# Patient Record
Sex: FEMALE | Race: WHITE | NOT HISPANIC OR LATINO | Employment: OTHER | URBAN - METROPOLITAN AREA
[De-identification: names, ages, dates, MRNs, and addresses within clinical notes are randomized per-mention and may not be internally consistent; named-entity substitution may affect disease eponyms.]

---

## 2017-01-31 ENCOUNTER — PATIENT MESSAGE (OUTPATIENT)
Dept: OBSTETRICS AND GYNECOLOGY | Facility: CLINIC | Age: 62
End: 2017-01-31

## 2017-01-31 ENCOUNTER — TELEPHONE (OUTPATIENT)
Dept: OBSTETRICS AND GYNECOLOGY | Facility: CLINIC | Age: 62
End: 2017-01-31

## 2017-01-31 NOTE — TELEPHONE ENCOUNTER
----- Message from Yamilet Padron sent at 1/31/2017  3:07 PM CST -----  Contact: Patient  x 1st Request  _ 2nd Request  _ 3rd Request    Who: SHIRLEY ALSTON [[id number]]    Why: Patient is calling in regards to scheduling a consult for hormones. Patient is needing a call back from staff.     What Number to Call Back: Patient can be reached at [phone].    When to Expect a call back: (Before the end of the day)  -- if call after 3:00 call back will be tomorrow.

## 2017-01-31 NOTE — TELEPHONE ENCOUNTER
Pt notified that Premarin vaginal cream from [facility] has arrived at office and she can  at her convenience. States will  this Thurs

## 2017-01-31 NOTE — TELEPHONE ENCOUNTER
"Advised pt per Dr Bay he recommeds she see Dr Armando for consult regarding HRT, she is specialist in this area. Explained Dr Bay is specialist for [facility] and pt should establish herself with regular gyn for her routine care. Pt states she does not wish to see any "female doctors" only wants to see "male doctors" requests list of male gyn drs at [facility] be sent to her [employer] system. Also complains of area of vulva irritation "not healed", states has not used clobetasol consistently, states does not like ointment would rather cream or paste. Explained why Dr Bay prefers ointment over cream and that he does not use any "paste". Encouraged pt to use medication as directed and if area of concern continues to be a problem will need to schedule follow up appt in vulva clinic for evaluation. Pt states "in post office have to go" hung up  "

## 2017-02-01 ENCOUNTER — PATIENT MESSAGE (OUTPATIENT)
Dept: OBSTETRICS AND GYNECOLOGY | Facility: CLINIC | Age: 62
End: 2017-02-01

## 2017-04-24 ENCOUNTER — TELEPHONE (OUTPATIENT)
Dept: OBSTETRICS AND GYNECOLOGY | Facility: CLINIC | Age: 62
End: 2017-04-24

## 2017-04-24 NOTE — TELEPHONE ENCOUNTER
----- Message from Glenroy Barajas sent at 4/24/2017 12:25 PM CDT -----  Contact: Pt  X_ 1st Request  _ 2nd Request  _ 3rd Request    Who:SHIRLEY ALSTON [[id number]]    Why: Patient would like to speak with staff in regards to a follow up appt at the vulva clinic; patient needs a refill of conjugated estrogens (PREMARIN) vaginal cream called into [facility] patient assistant program    What Number to Call Back: [phone] (not between 1 & 2) do not contact through the portal at the moment    When to Expect a call back: (Before the end of the day)  -- if call after 3:00 call back will be tomorrow.

## 2017-04-24 NOTE — TELEPHONE ENCOUNTER
[facility] clinic appt scheduled with brittany Pemberton pt ID # [id number], contact # [phone]. Order placed for Premarin vaginal cream, order # [id number]- should be received within 10 business days.

## 2017-04-27 ENCOUNTER — PATIENT MESSAGE (OUTPATIENT)
Dept: OBSTETRICS AND GYNECOLOGY | Facility: CLINIC | Age: 62
End: 2017-04-27

## 2017-05-16 ENCOUNTER — OFFICE VISIT (OUTPATIENT)
Dept: OBSTETRICS AND GYNECOLOGY | Facility: CLINIC | Age: 62
End: 2017-05-16
Attending: OBSTETRICS & GYNECOLOGY
Payer: COMMERCIAL

## 2017-05-16 VITALS
WEIGHT: 199.31 LBS | HEIGHT: 64 IN | BODY MASS INDEX: 34.03 KG/M2 | SYSTOLIC BLOOD PRESSURE: 148 MMHG | DIASTOLIC BLOOD PRESSURE: 90 MMHG

## 2017-05-16 DIAGNOSIS — B37.31 CANDIDIASIS OF VULVA AND VAGINA: ICD-10-CM

## 2017-05-16 DIAGNOSIS — N95.2 POSTMENOPAUSAL ATROPHIC VAGINITIS: ICD-10-CM

## 2017-05-16 DIAGNOSIS — N95.1 POST MENOPAUSAL SYNDROME: ICD-10-CM

## 2017-05-16 DIAGNOSIS — Z79.890 POSTMENOPAUSAL HORMONE REPLACEMENT THERAPY: ICD-10-CM

## 2017-05-16 DIAGNOSIS — N76.2 ATROPHIC VULVITIS: ICD-10-CM

## 2017-05-16 DIAGNOSIS — L90.0 LICHEN SCLEROSUS: Primary | ICD-10-CM

## 2017-05-16 DIAGNOSIS — N90.89 IRRITATION OF VULVA: ICD-10-CM

## 2017-05-16 PROCEDURE — 99999 PR PBB SHADOW E&M-EST. PATIENT-LVL III: CPT | Mod: PBBFAC,,, | Performed by: OBSTETRICS & GYNECOLOGY

## 2017-05-16 PROCEDURE — 87210 SMEAR WET MOUNT SALINE/INK: CPT | Mod: QW,S$GLB,, | Performed by: OBSTETRICS & GYNECOLOGY

## 2017-05-16 PROCEDURE — 87102 FUNGUS ISOLATION CULTURE: CPT

## 2017-05-16 PROCEDURE — 3080F DIAST BP >= 90 MM HG: CPT | Mod: S$GLB,,, | Performed by: OBSTETRICS & GYNECOLOGY

## 2017-05-16 PROCEDURE — 1160F RVW MEDS BY RX/DR IN RCRD: CPT | Mod: S$GLB,,, | Performed by: OBSTETRICS & GYNECOLOGY

## 2017-05-16 PROCEDURE — 99214 OFFICE O/P EST MOD 30 MIN: CPT | Mod: S$GLB,,, | Performed by: OBSTETRICS & GYNECOLOGY

## 2017-05-16 PROCEDURE — 3077F SYST BP >= 140 MM HG: CPT | Mod: S$GLB,,, | Performed by: OBSTETRICS & GYNECOLOGY

## 2017-05-16 RX ORDER — PROGESTERONE 200 MG/1
CAPSULE ORAL
Qty: 30 CAPSULE | Refills: 11 | Status: SHIPPED | OUTPATIENT
Start: 2017-05-16 | End: 2023-10-13 | Stop reason: CLARIF

## 2017-05-16 RX ORDER — ESTRADIOL 0.04 MG/D
1 FILM, EXTENDED RELEASE TRANSDERMAL
Qty: 8 PATCH | Refills: 11 | Status: SHIPPED | OUTPATIENT
Start: 2017-05-18 | End: 2021-01-05

## 2017-05-16 NOTE — PROGRESS NOTES
Subjective:     Patient ID: Tori Hercules is a 62 y.o. female.     Chief Complaint: vulva irritation (follow up, last candida culture positive albicans, DOMINICK)     History of Present Illness: This patient is a 62 y.o. female, who presents to the GYN [facility] for evaluation of suspected lichen sclerosis and vulvar irritation and itching.  The patient complains of progressive problems with menopausal symptoms and is desirous of restarting her systemic hormone replacement therapy.     Patient's last menstrual period was 11/02/2016.    Review of Systems    GENERAL: No fever, chills, fatigability or weightchange  SKIN: No rashes, itching or changes in color or texture of skin.  HEAD: No headaches or recent head trauma.  EYES: Visual acuity fine. No photophobia,r diplopia.  EARS: Denies earache or vertigo  NOSE: No loss of smell, no epistaxis or postnasal drip.  MOUTH & THROAT: No hoarseness or change in voice.   NODES: Denies swollen glands.  CHEST: Denies ALMODOVAR, cyanosis, wheezing, cough and sputum production.  CARDIOVASCULAR: Denies chest pain, PND, orthopnea or reduced exercise tolerance.  ABDOMEN: Appetite fine. No weight loss. bloating, Denies diarrhea, abdominal pain, hematemesis or blood in stool.  URINARY: No flank pain, dysuria or hematuria.  PERIPHERAL VASCULAR: No claudication or cyanosis.Varicosities  MUSCULOSKELETAL: No joint stiffness or swelling. Denies back pain.muscle aches  NEUROLOGIC: No history of seizures, paralysis, alteration of gait or coordination.       Objective:       Physical Exam     APPEARANCE: Well nourished, well developed, in no acute distress.    GENITOURINARY:  Vulva: The vulva architecture was abnormal with  fusion of the prepuce and frenulum with burying of the clitoris under the scarred clitoral adams, there was mild reabsorption of the labia minora, the right being greater than the left. The tender, thin, white plaque located on the right side of the vestibule/perineum junction  has disappeared. The 2 mm ulcerated area on the right at the introitus has healed.  Urethral Meatus: Normal size and location, no lesions, no prolapse.  Urethra: No masses, tenderness, prolapse or scarring.  Vagina: thin, atrophic and with decreased rugae, no discharge, no significant cystocele or rectocele.  Cervix: No lesions, normal diameter, no stenosis, no cervical motion tenderness. .  Uterus: 6 week size, regular shape, mobile, non-tender, normal position, good support.  Adnexa: No masses, tenderness or CDS nodularity.  Anus Perineum: No lesions, no relaxation, no external hemorrhoids.  Abdomen: No masses, tenderness, hernia or ascites, no hepatasplenomegaly  Skin: No rashes, lesions, ulcers, acne, hirsutism.  Peripheral/lower extremities: No edema, erythema or tenderness.  Lymphatic: No axillary, neck or groin nodes palp.  Mental Status: Alert, oriented x 3, normal affect and mood.          @PROCEDURE:@  Wet Prep:  pH = 4.4  -WBCS = occasional  -Lactobacilli = numerous  -BV = Amsel negative  -Candida = Hyphae none seen  -Trichomnas = none seen  -Cells- Basal and Parabasal, Superficial: Maturation: Superficial cells predominate  -Impression: Negative well prepped  -Treatment: No new therapy indicated.  -[facility] in   weeks         Assessment:      1. Possible Lichen sclerosus    2. Postmenopausal atrophic vaginitis    3. Irritation of vulva    4. Candidiasis of vulva and vagina    5. Post menopausal syndrome    6. Postmenopausal hormone replacement therapy               Plan:  1.  Continue Clobetasol ointment and local vaginal Premarin therapy.   2.  Discussed at length the pros, cons, risks and benefits of systemic hormone replacement therapy and the increased risk of stroke and heart attack involved in the use of these hormones.  The patient was informed that she must use progesterone when she on systemic estrogen hormone replacement.  She denies a peanut ALLERGY.  States that in her opinion the  benefit of the hormones outweigh their risk.  3.  Patient was informed that she must fine a gynecologist that will continue to care for her and allow her to use the hormone replacement therapy that I ordered because I was not going to reorder that medication.  4.  Candida culture of the vagina taken today.  5.  Return to clinic in 6 months for follow-up.                        Orders Placed This Encounter   Procedures    Fungus culture

## 2017-06-21 LAB — FUNGUS SPEC CULT: NORMAL

## 2017-12-19 DIAGNOSIS — N95.2 POSTMENOPAUSAL ATROPHIC VAGINITIS: ICD-10-CM

## 2017-12-19 DIAGNOSIS — N90.89 IRRITATION OF VULVA: ICD-10-CM

## 2017-12-19 DIAGNOSIS — Z79.890 POSTMENOPAUSAL HORMONE REPLACEMENT THERAPY: ICD-10-CM

## 2017-12-19 DIAGNOSIS — Z78.0 POST-MENOPAUSAL: ICD-10-CM

## 2017-12-19 DIAGNOSIS — B37.31 CANDIDIASIS OF VULVA AND VAGINA: ICD-10-CM

## 2017-12-19 DIAGNOSIS — N94.89 BURNING SENSATION OF VULVA: ICD-10-CM

## 2017-12-19 RX ORDER — CLOBETASOL PROPIONATE 0.5 MG/G
OINTMENT TOPICAL
Qty: 30 G | Refills: 2 | Status: SHIPPED | OUTPATIENT
Start: 2017-12-19

## 2018-10-31 ENCOUNTER — TELEPHONE (OUTPATIENT)
Dept: OBSTETRICS AND GYNECOLOGY | Facility: CLINIC | Age: 63
End: 2018-10-31

## 2018-10-31 NOTE — TELEPHONE ENCOUNTER
----- Message from Gricelda To sent at 10/31/2018  9:55 AM CDT -----  Contact: self  Call back number is [phone].Pt has been having a lot of irration in the vagina area for about a week now. Now she is having pain with it. Tried to find her a soon appt but could not. Please give her a call.

## 2018-10-31 NOTE — TELEPHONE ENCOUNTER
Pt states having increased irritation and pain with relations. States has not been using vaginal estrogen and clobetasol. Discussed use of these meds as recommended at last visit. Offered pt appt today with another provider and she refused. States will resume meds as directed.Vulva clinic appt scheduled with pt.

## 2018-11-21 ENCOUNTER — OFFICE VISIT (OUTPATIENT)
Dept: OBSTETRICS AND GYNECOLOGY | Facility: CLINIC | Age: 63
End: 2018-11-21
Attending: OBSTETRICS & GYNECOLOGY
Payer: COMMERCIAL

## 2018-11-21 VITALS
DIASTOLIC BLOOD PRESSURE: 86 MMHG | BODY MASS INDEX: 35.61 KG/M2 | SYSTOLIC BLOOD PRESSURE: 126 MMHG | HEIGHT: 64 IN | WEIGHT: 208.56 LBS

## 2018-11-21 DIAGNOSIS — Z86.19 HISTORY OF HERPES GENITALIS: ICD-10-CM

## 2018-11-21 DIAGNOSIS — N95.1 POST MENOPAUSAL SYNDROME: ICD-10-CM

## 2018-11-21 DIAGNOSIS — N90.89 IRRITATION OF VULVA: ICD-10-CM

## 2018-11-21 DIAGNOSIS — N95.2 POSTMENOPAUSAL ATROPHIC VAGINITIS: ICD-10-CM

## 2018-11-21 DIAGNOSIS — B37.31 CANDIDIASIS OF VULVA AND VAGINA: ICD-10-CM

## 2018-11-21 DIAGNOSIS — L90.0 LICHEN SCLEROSUS: Primary | ICD-10-CM

## 2018-11-21 PROCEDURE — 56820 COLPOSCOPY VULVA: CPT | Mod: S$GLB,,, | Performed by: OBSTETRICS & GYNECOLOGY

## 2018-11-21 PROCEDURE — 99214 OFFICE O/P EST MOD 30 MIN: CPT | Mod: 25,S$GLB,, | Performed by: OBSTETRICS & GYNECOLOGY

## 2018-11-21 PROCEDURE — 87102 FUNGUS ISOLATION CULTURE: CPT

## 2018-11-21 PROCEDURE — 3008F BODY MASS INDEX DOCD: CPT | Mod: CPTII,S$GLB,, | Performed by: OBSTETRICS & GYNECOLOGY

## 2018-11-21 PROCEDURE — 99999 PR PBB SHADOW E&M-EST. PATIENT-LVL II: CPT | Mod: PBBFAC,,, | Performed by: OBSTETRICS & GYNECOLOGY

## 2018-11-21 RX ORDER — CLOBETASOL PROPIONATE 0.5 MG/G
OINTMENT TOPICAL
Qty: 30 G | Refills: 3 | Status: SHIPPED | OUTPATIENT
Start: 2018-11-21 | End: 2021-01-05 | Stop reason: SDUPTHER

## 2018-11-21 RX ORDER — VALACYCLOVIR HYDROCHLORIDE 1 G/1
TABLET, FILM COATED ORAL
Qty: 30 TABLET | Refills: 2 | Status: SHIPPED | OUTPATIENT
Start: 2018-11-21 | End: 2021-01-05 | Stop reason: SDUPTHER

## 2018-11-21 NOTE — PROGRESS NOTES
Subjective:     Patient ID: Tori Hercules is a 63 y.o. female.     Chief Complaint: No chief complaint on file.     History of Present Illness: This patient is a 63 y.o. female, who presents to the GYN [facility]va clinic for evaluation of lichen sclerosis.  The patient is concerned about the adhesions of the clitoris to the prepuce and frenulum.  Today she is not experiencing vulvar irritation.    Patient's last menstrual period was 11/02/2016.    Review of Systems    GENERAL: No fever, chills, fatigability or weightchange  SKIN: No rashes, itching or changes in color or texture of skin.  HEAD: No headaches or recent head trauma.  EYES: Visual acuity fine. No photophobia,r diplopia.  EARS: Denies earache or vertigo  NOSE: No loss of smell, no epistaxis or postnasal drip.  MOUTH & THROAT: No hoarseness or change in voice.   NODES: Denies swollen glands.  CHEST: Denies ALMODOVAR, cyanosis, wheezing, cough and sputum production.  CARDIOVASCULAR: Denies chest pain, PND, orthopnea or reduced exercise tolerance.  ABDOMEN: Appetite fine. No weight loss. bloating, Denies diarrhea, abdominal pain, hematemesis or blood in stool.  URINARY: No flank pain, dysuria or hematuria.  PERIPHERAL VASCULAR: No claudication or cyanosis.Varicosities  MUSCULOSKELETAL: No joint stiffness or swelling. Denies back pain.muscle aches  NEUROLOGIC: No history of seizures, paralysis, alteration of gait or coordination.       Objective:       Physical Exam     APPEARANCE: Well nourished, well developed, in no acute distress.    GENITOURINARY:  Vulva: The vulva architecture was abnormal with  fusion of the prepuce and frenulum with burying of the clitoris under the scarred clitoral adams, there was mild reabsorption of the labia minora, the right being greater than the left. The tender, thin, white plaque located on the right side of the vestibule/perineum junction was not seen.  No ulcerations were noted today Urethral Meatus: Normal size and location, no  lesions, no prolapse.  Urethra: No masses, tenderness, prolapse or scarring.  Vagina: thin, atrophic and with decreased rugae, no discharge, no significant cystocele or rectocele.  Cervix: No lesions, normal diameter, no stenosis, no cervical motion tenderness. .  Uterus: 6 week size, regular shape, mobile, non-tender, normal position, good support.  Adnexa: No masses, tenderness or CDS nodularity.  Anus Perineum: No lesions, no relaxation, no external hemorrhoids.  Abdomen: No masses, tenderness, hernia or ascites, no hepatasplenomegaly  Skin: No rashes, lesions, ulcers, acne, hirsutism.  Peripheral/lower extremities: No edema, erythema or tenderness.  Lymphatic: No axillary, neck or groin nodes palp.  Mental Status: Alert, oriented x 3, normal affect and mood.          @PROCEDURE:@      Vulvoscopy:The Acetic acid was not applied to the vulva.  The magnifying lens was used to evaluate the labia majoria, labia minora, clitoral adams area, vestibule, peineum and perianal areas. There were no lesions compatible with Tam noted, nor were there any ulcerations nor thickened plaque-like areas. No biopsies were taken.            Assessment:      1. Possible Lichen sclerosus    2. Irritation of vulva    3. Postmenopausal atrophic vaginitis    4. Post menopausal syndrome    5. History of herpes genitalis               Plan:  1.  Discuss the need to use clobetasol ointment and estrogen vaginal cream at least twice a week.  She requests acyclovir for suppression of herpes.  Because of the cost savings,  she requests a 1000 mg      strength tablets so that she can cut it in half.  2.  Candida.  Culture of vagina taken today.  3.  Discuss the pros cons risks and benefits of surgery to free the fused prepuce and frenulum from the clitoris.  Patient was informed that scarring and decreased sensation of the clitoris to sexual stimulation           could occur occur secondary to the surgery.  4.  Return to clinic for vulvar loss copy  in 6 months                No orders of the defined types were placed in this encounter.

## 2018-11-22 ENCOUNTER — PATIENT MESSAGE (OUTPATIENT)
Dept: OBSTETRICS AND GYNECOLOGY | Facility: CLINIC | Age: 63
End: 2018-11-22

## 2018-11-22 DIAGNOSIS — B00.9 HERPES: ICD-10-CM

## 2018-11-22 DIAGNOSIS — Z78.0 POST-MENOPAUSAL: ICD-10-CM

## 2018-11-22 DIAGNOSIS — B37.31 CANDIDIASIS OF VULVA AND VAGINA: ICD-10-CM

## 2018-11-22 DIAGNOSIS — Z79.890 POSTMENOPAUSAL HORMONE REPLACEMENT THERAPY: ICD-10-CM

## 2018-11-22 DIAGNOSIS — N94.89 BURNING SENSATION OF VULVA: ICD-10-CM

## 2018-11-22 DIAGNOSIS — N95.2 POSTMENOPAUSAL ATROPHIC VAGINITIS: ICD-10-CM

## 2018-11-22 DIAGNOSIS — N90.89 IRRITATION OF VULVA: ICD-10-CM

## 2018-11-23 RX ORDER — CLOBETASOL PROPIONATE 0.5 MG/G
OINTMENT TOPICAL
Qty: 30 G | Refills: 3 | Status: CANCELLED | OUTPATIENT
Start: 2018-11-23

## 2018-11-23 RX ORDER — VALACYCLOVIR HYDROCHLORIDE 1 G/1
TABLET, FILM COATED ORAL
Qty: 30 TABLET | Refills: 2 | Status: CANCELLED | OUTPATIENT
Start: 2018-11-23

## 2018-11-23 NOTE — TELEPHONE ENCOUNTER
The valtrex was supposed to be 90 days [facility];  [facility]osol was supposed to go to [facility].

## 2018-11-26 RX ORDER — VALACYCLOVIR HYDROCHLORIDE 1 G/1
1000 TABLET, FILM COATED ORAL DAILY
Qty: 90 TABLET | Refills: 0 | Status: SHIPPED | OUTPATIENT
Start: 2018-11-26 | End: 2019-02-24

## 2018-12-21 LAB — FUNGUS SPEC CULT: NORMAL

## 2021-01-02 ENCOUNTER — PATIENT MESSAGE (OUTPATIENT)
Dept: OBSTETRICS AND GYNECOLOGY | Facility: CLINIC | Age: 66
End: 2021-01-02

## 2021-01-05 ENCOUNTER — OFFICE VISIT (OUTPATIENT)
Dept: OBSTETRICS AND GYNECOLOGY | Facility: CLINIC | Age: 66
End: 2021-01-05
Attending: OBSTETRICS & GYNECOLOGY
Payer: MEDICARE

## 2021-01-05 VITALS
DIASTOLIC BLOOD PRESSURE: 70 MMHG | SYSTOLIC BLOOD PRESSURE: 142 MMHG | HEIGHT: 64 IN | WEIGHT: 240 LBS | BODY MASS INDEX: 40.97 KG/M2

## 2021-01-05 DIAGNOSIS — Z78.0 POST-MENOPAUSAL: ICD-10-CM

## 2021-01-05 DIAGNOSIS — N90.89 IRRITATION OF VULVA: Primary | ICD-10-CM

## 2021-01-05 DIAGNOSIS — N95.2 POSTMENOPAUSAL ATROPHIC VAGINITIS: ICD-10-CM

## 2021-01-05 DIAGNOSIS — N94.89 BURNING SENSATION OF VULVA: ICD-10-CM

## 2021-01-05 DIAGNOSIS — B37.31 CANDIDIASIS OF VULVA AND VAGINA: ICD-10-CM

## 2021-01-05 DIAGNOSIS — B00.9 HERPES: ICD-10-CM

## 2021-01-05 DIAGNOSIS — Z79.890 POSTMENOPAUSAL HORMONE REPLACEMENT THERAPY: ICD-10-CM

## 2021-01-05 PROCEDURE — 1126F PR PAIN SEVERITY QUANTIFIED, NO PAIN PRESENT: ICD-10-PCS | Mod: S$GLB,,, | Performed by: OBSTETRICS & GYNECOLOGY

## 2021-01-05 PROCEDURE — 3008F PR BODY MASS INDEX (BMI) DOCUMENTED: ICD-10-PCS | Mod: CPTII,S$GLB,, | Performed by: OBSTETRICS & GYNECOLOGY

## 2021-01-05 PROCEDURE — 99214 PR OFFICE/OUTPT VISIT, EST, LEVL IV, 30-39 MIN: ICD-10-PCS | Mod: S$GLB,,, | Performed by: OBSTETRICS & GYNECOLOGY

## 2021-01-05 PROCEDURE — 1159F MED LIST DOCD IN RCRD: CPT | Mod: S$GLB,,, | Performed by: OBSTETRICS & GYNECOLOGY

## 2021-01-05 PROCEDURE — 1159F PR MEDICATION LIST DOCUMENTED IN MEDICAL RECORD: ICD-10-PCS | Mod: S$GLB,,, | Performed by: OBSTETRICS & GYNECOLOGY

## 2021-01-05 PROCEDURE — 3008F BODY MASS INDEX DOCD: CPT | Mod: CPTII,S$GLB,, | Performed by: OBSTETRICS & GYNECOLOGY

## 2021-01-05 PROCEDURE — 1126F AMNT PAIN NOTED NONE PRSNT: CPT | Mod: S$GLB,,, | Performed by: OBSTETRICS & GYNECOLOGY

## 2021-01-05 PROCEDURE — 3288F PR FALLS RISK ASSESSMENT DOCUMENTED: ICD-10-PCS | Mod: CPTII,S$GLB,, | Performed by: OBSTETRICS & GYNECOLOGY

## 2021-01-05 PROCEDURE — 99999 PR PBB SHADOW E&M-EST. PATIENT-LVL III: ICD-10-PCS | Mod: PBBFAC,,, | Performed by: OBSTETRICS & GYNECOLOGY

## 2021-01-05 PROCEDURE — 87106 FUNGI IDENTIFICATION YEAST: CPT

## 2021-01-05 PROCEDURE — 99214 OFFICE O/P EST MOD 30 MIN: CPT | Mod: S$GLB,,, | Performed by: OBSTETRICS & GYNECOLOGY

## 2021-01-05 PROCEDURE — 1101F PR PT FALLS ASSESS DOC 0-1 FALLS W/OUT INJ PAST YR: ICD-10-PCS | Mod: CPTII,S$GLB,, | Performed by: OBSTETRICS & GYNECOLOGY

## 2021-01-05 PROCEDURE — 99999 PR PBB SHADOW E&M-EST. PATIENT-LVL III: CPT | Mod: PBBFAC,,, | Performed by: OBSTETRICS & GYNECOLOGY

## 2021-01-05 PROCEDURE — 87102 FUNGUS ISOLATION CULTURE: CPT

## 2021-01-05 PROCEDURE — 1101F PT FALLS ASSESS-DOCD LE1/YR: CPT | Mod: CPTII,S$GLB,, | Performed by: OBSTETRICS & GYNECOLOGY

## 2021-01-05 PROCEDURE — 3288F FALL RISK ASSESSMENT DOCD: CPT | Mod: CPTII,S$GLB,, | Performed by: OBSTETRICS & GYNECOLOGY

## 2021-01-05 RX ORDER — CLOBETASOL PROPIONATE 0.5 MG/G
OINTMENT TOPICAL
Qty: 30 G | Refills: 3 | Status: SHIPPED | OUTPATIENT
Start: 2021-01-05

## 2021-01-05 RX ORDER — PANTOPRAZOLE SODIUM 40 MG/1
TABLET, DELAYED RELEASE ORAL
COMMUNITY
Start: 2020-11-14

## 2021-01-05 RX ORDER — VALACYCLOVIR HYDROCHLORIDE 1 G/1
TABLET, FILM COATED ORAL
Qty: 30 TABLET | Refills: 2 | Status: SHIPPED | OUTPATIENT
Start: 2021-01-05

## 2021-01-05 RX ORDER — VALACYCLOVIR HYDROCHLORIDE 500 MG/1
TABLET, FILM COATED ORAL
Qty: 30 TABLET | Refills: 3 | Status: SHIPPED | OUTPATIENT
Start: 2021-01-05

## 2021-01-05 RX ORDER — PREGABALIN 50 MG/1
CAPSULE ORAL
COMMUNITY
End: 2023-10-13 | Stop reason: CLARIF

## 2021-01-05 RX ORDER — ESTRADIOL 0.1 MG/G
CREAM VAGINAL
Qty: 42.5 G | Refills: 4 | Status: SHIPPED | OUTPATIENT
Start: 2021-01-05

## 2021-01-05 RX ORDER — CHLORTHALIDONE 25 MG/1
TABLET ORAL
COMMUNITY

## 2021-01-05 RX ORDER — CLOBETASOL PROPIONATE 0.5 MG/G
OINTMENT TOPICAL
Qty: 30 G | Refills: 3 | Status: SHIPPED | OUTPATIENT
Start: 2021-01-05 | End: 2022-06-09 | Stop reason: SDUPTHER

## 2021-01-05 RX ORDER — HYDRALAZINE HYDROCHLORIDE 25 MG/1
TABLET, FILM COATED ORAL
COMMUNITY
Start: 2020-11-17 | End: 2023-10-13 | Stop reason: CLARIF

## 2021-01-05 RX ORDER — PREGABALIN 75 MG/1
CAPSULE ORAL
COMMUNITY

## 2021-01-05 RX ORDER — ROSUVASTATIN CALCIUM 10 MG/1
TABLET, COATED ORAL
COMMUNITY
Start: 2020-10-23

## 2021-01-05 RX ORDER — NYSTATIN 100000 [USP'U]/G
POWDER TOPICAL
Qty: 60 G | Refills: 3 | Status: SHIPPED | OUTPATIENT
Start: 2021-01-05 | End: 2023-10-23

## 2021-01-05 RX ORDER — CELECOXIB 200 MG/1
CAPSULE ORAL
COMMUNITY
End: 2023-10-13 | Stop reason: CLARIF

## 2021-01-05 RX ORDER — LAMOTRIGINE 25 MG/1
TABLET ORAL
COMMUNITY

## 2021-01-05 RX ORDER — CONJUGATED ESTROGENS 0.62 MG/G
CREAM VAGINAL
Qty: 45 G | Refills: 12 | Status: SHIPPED | OUTPATIENT
Start: 2021-01-05

## 2021-01-11 ENCOUNTER — TELEPHONE (OUTPATIENT)
Dept: OBSTETRICS AND GYNECOLOGY | Facility: CLINIC | Age: 66
End: 2021-01-11

## 2021-01-13 DIAGNOSIS — B37.9 CANDIDA INFECTION: Primary | ICD-10-CM

## 2021-01-13 RX ORDER — FLUCONAZOLE 200 MG/1
TABLET ORAL
Qty: 3 TABLET | Refills: 0 | Status: SHIPPED | OUTPATIENT
Start: 2021-01-13 | End: 2023-10-13 | Stop reason: CLARIF

## 2021-01-17 ENCOUNTER — PATIENT MESSAGE (OUTPATIENT)
Dept: OBSTETRICS AND GYNECOLOGY | Facility: CLINIC | Age: 66
End: 2021-01-17

## 2021-01-21 ENCOUNTER — PATIENT MESSAGE (OUTPATIENT)
Dept: OBSTETRICS AND GYNECOLOGY | Facility: CLINIC | Age: 66
End: 2021-01-21

## 2021-02-02 LAB — FUNGUS SPEC CULT: ABNORMAL

## 2021-04-26 ENCOUNTER — PATIENT MESSAGE (OUTPATIENT)
Dept: RESEARCH | Facility: HOSPITAL | Age: 66
End: 2021-04-26

## 2022-06-09 RX ORDER — CLOBETASOL PROPIONATE 0.5 MG/G
OINTMENT TOPICAL
Qty: 30 G | Refills: 3 | Status: SHIPPED | OUTPATIENT
Start: 2022-06-09

## 2022-06-09 NOTE — TELEPHONE ENCOUNTER
----- Message from Jyoti Ramos sent at 6/9/2022 12:20 PM CDT -----  Regarding: medication approval  Name of Who is Calling:SHIRLEY ALSTON [[id number]]          What is the request in detail: Patient is requesting a call back in reference to medication approval for   clobetasol 0.05% (TEMOVATE) 0.05 % Oint        Can the clinic reply by [facility]: no           [facility][phone]

## 2023-10-13 ENCOUNTER — HOSPITAL ENCOUNTER (OUTPATIENT)
Dept: PREADMISSION TESTING | Facility: OTHER | Age: 68
Discharge: HOME | End: 2023-10-13
Attending: ORTHOPAEDIC SURGERY
Payer: MEDICARE

## 2023-10-13 ENCOUNTER — ANESTHESIA EVENT (OUTPATIENT)
Dept: SURGERY | Facility: OTHER | Age: 68
End: 2023-10-13
Payer: MEDICARE

## 2023-10-13 VITALS
WEIGHT: 238 LBS | HEIGHT: 64 IN | SYSTOLIC BLOOD PRESSURE: 133 MMHG | HEART RATE: 60 BPM | BODY MASS INDEX: 40.63 KG/M2 | OXYGEN SATURATION: 97 % | DIASTOLIC BLOOD PRESSURE: 60 MMHG | TEMPERATURE: 98 F

## 2023-10-13 DIAGNOSIS — Z01.818 PREOP TESTING: Primary | ICD-10-CM

## 2023-10-13 LAB
BILIRUB UR QL STRIP: NEGATIVE
CLARITY UR: CLEAR
COLOR UR: YELLOW
GLUCOSE UR QL STRIP: NEGATIVE
HGB UR QL STRIP: NEGATIVE
KETONES UR QL STRIP: NEGATIVE
LEUKOCYTE ESTERASE UR QL STRIP: NEGATIVE
NITRITE UR QL STRIP: NEGATIVE
PH UR STRIP: 6 [PH] (ref 5–8)
PROT UR QL STRIP: NEGATIVE
SP GR UR STRIP: 1.01 (ref 1–1.03)
URN SPEC COLLECT METH UR: NORMAL
UROBILINOGEN UR STRIP-ACNC: NEGATIVE EU/DL

## 2023-10-13 PROCEDURE — 81003 URINALYSIS AUTO W/O SCOPE: CPT | Performed by: ORTHOPAEDIC SURGERY

## 2023-10-13 RX ORDER — ASPIRIN 81 MG/1
81 TABLET ORAL DAILY
Status: ON HOLD | COMMUNITY
End: 2023-10-23 | Stop reason: HOSPADM

## 2023-10-13 RX ORDER — TRAMADOL HYDROCHLORIDE 50 MG/1
100 TABLET ORAL EVERY 12 HOURS PRN
Status: ON HOLD | COMMUNITY
End: 2023-10-23 | Stop reason: HOSPADM

## 2023-10-13 RX ORDER — FERROUS GLUCONATE 324(38)MG
TABLET ORAL
COMMUNITY
Start: 2023-05-02

## 2023-10-13 RX ORDER — CLONIDINE HYDROCHLORIDE 0.1 MG/1
1 TABLET ORAL 2 TIMES DAILY
COMMUNITY
Start: 2023-04-03

## 2023-10-13 RX ORDER — LIDOCAINE HYDROCHLORIDE 10 MG/ML
0.5 INJECTION, SOLUTION EPIDURAL; INFILTRATION; INTRACAUDAL; PERINEURAL ONCE
Status: CANCELLED | OUTPATIENT
Start: 2023-10-13 | End: 2023-10-13

## 2023-10-13 RX ORDER — LEVOTHYROXINE SODIUM 175 UG/1
175 TABLET ORAL
COMMUNITY

## 2023-10-13 RX ORDER — IRBESARTAN 300 MG/1
1 TABLET ORAL DAILY
COMMUNITY
Start: 2023-10-09

## 2023-10-13 RX ORDER — SODIUM CHLORIDE, SODIUM LACTATE, POTASSIUM CHLORIDE, CALCIUM CHLORIDE 600; 310; 30; 20 MG/100ML; MG/100ML; MG/100ML; MG/100ML
INJECTION, SOLUTION INTRAVENOUS CONTINUOUS
Status: CANCELLED | OUTPATIENT
Start: 2023-10-13

## 2023-10-13 RX ORDER — TIZANIDINE 4 MG/1
4 TABLET ORAL EVERY 6 HOURS
COMMUNITY
Start: 2023-10-06

## 2023-10-13 RX ORDER — CARVEDILOL 12.5 MG/1
TABLET ORAL 2 TIMES DAILY
COMMUNITY
Start: 2023-10-11

## 2023-10-13 RX ORDER — FAMOTIDINE 20 MG/1
20 TABLET, FILM COATED ORAL
Status: CANCELLED | OUTPATIENT
Start: 2023-10-13 | End: 2023-10-13

## 2023-10-13 RX ORDER — ACETAMINOPHEN 500 MG
1000 TABLET ORAL
Status: CANCELLED | OUTPATIENT
Start: 2023-10-13 | End: 2023-10-13

## 2023-10-13 NOTE — DISCHARGE INSTRUCTIONS
Information to Prepare you for your Surgery    PRE-ADMIT TESTING -  [phone]    [address]          Your surgery has been scheduled at [facility]. We are pleased to have the opportunity to serve you. For Further Information please call [phone].    On the day of surgery please report to the Information Desk on the 1st floor.    CONTACT YOUR PHYSICIAN'S OFFICE THE DAY PRIOR TO YOUR SURGERY TO OBTAIN YOUR ARRIVAL TIME.     The evening before surgery do not eat anything after 9 p.m. ( this includes hard candy, chewing gum and mints).  You may only have GATORADE, POWERADE AND WATER  from 9 p.m. until you leave your home.   DO NOT DRINK ANY LIQUIDS ON THE WAY TO THE HOSPITAL.      Why does your anesthesiologist allow you to drink Gatorade/Powerade before surgery?  Gatorade/Powerade helps to increase your comfort before surgery and to decrease your nausea after surgery. The carbohydrates in Gatorade/Powerade help reduce your body's stress response to surgery.  If you are a diabetic-drink only water prior to surgery.    Outpatient Surgery- May allow 2 adult Support Persons (1 being the designated ) for all surgical/procedural patients. A breastfeeding mother will be allowed her infant and 2 adult Support Persons.       SPECIAL MEDICATION INSTRUCTIONS: TAKE medications checked off by the Anesthesiologist on your Medication List.    Angiogram Patients: Take medications as instructed by your physician, including aspirin.     Surgery Patients:    If you take ASPIRIN - Your PHYSICIAN/SURGEON will need to inform you IF/OR when you need to stop taking aspirin prior to your surgery.     The week prior to surgery do not ot take any medications containing IBUPROFEN or NSAIDS ( Advil, Motrin, Goodys, BC, Aleve, Naproxen etc) If you are not sure if you should take a medicine please call your surgeon's office.  Ok to take Tylenol    Do Not Wear any  make-up (especially eye make-up) to surgery. Please remove any false eyelashes or eyelash extensions. If you arrive the day of surgery with makeup/eyelashes on you will be required to remove prior to surgery. (There is a risk of corneal abrasions if eye makeup/eyelash extensions are not removed)      Leave all valuables at home.   Do Not wear any jewelry or watches, including any metal in body piercings. Jewelry must be removed prior to coming to the hospital.  There is a possibility that rings that are unable to be removed may be cut off if they are on the surgical extremity.    Please remove all hair extensions, wigs, clips and any other metal accessories/ ornaments from your hair.  These items may pose a flammable/fire risk in Surgery and must be removed.    Do not shave your surgical area at least 5 days prior to your surgery. The surgical prep will be performed at the hospital according to Infection Control regulations.    Contact Lens must be removed before surgery. Either do not wear the contact lens or bring a case and solution for storage.  Please bring a container for eyeglasses or dentures as required.  Bring any paperwork your physician has provided, such as consent forms,  history and physicals, doctor's orders, etc.   Bring comfortable clothes that are loose fitting to wear upon discharge. Take into consideration the type of surgery being performed.  Maintain your diet as advised per your physician the day prior to surgery.      Adequate rest the night before surgery is advised.   Park in the Parking lot behind the hospital or in the [facility] across the street from the parking lot. Parking is complimentary.  If you will be discharged the same day as your procedure, please arrange for a responsible adult to drive you home or to accompany you if traveling by taxi.   YOU WILL NOT BE PERMITTED TO DRIVE OR TO LEAVE THE HOSPITAL ALONE AFTER SURGERY.   If you are being discharged the same day,  it is strongly recommended that you arrange for someone to remain with you for the first 24 hrs following your surgery.    The Surgeon will speak to your family/visitor after your surgery regarding the outcome of your surgery and post op care.  The Surgeon may speak to you after your surgery, but there is a possibility you may not remember the details.  Please check with your family members regarding the conversation with the Surgeon.    We strongly recommend whoever is bringing you home be present for discharge instructions.  This will ensure a thorough understanding for your post op home care.          Thank you for your cooperation.  The Staff of [facility].            Bathing Instructions with Hibiclens    Shower the evening before and morning of your procedure with Chlorhexidine (Hibiclens)  do not use Chlorhexidine on your face or genitals. Do not get in your eyes.  Wash your face with water and your regular face wash/soap  Use your regular shampoo  Apply Chlorhexidine (Hibiclens) directly on your skin or on a wet washcloth and wash gently. When showering: Move away from the shower stream when applying Chlorhexidine (Hibiclens) to avoid rinsing off too soon.  Rinse thoroughly with warm water  Do not dilute Chlorhexidine (Hibiclens)   Dry off as usual, do not use any deodorant, powder, body lotions, perfume, after shave or cologne.

## 2023-10-13 NOTE — ANESTHESIA PREPROCEDURE EVALUATION
10/13/2023  Tori Hercules is a 68 y.o., female.    Anesthesia Evaluation    I have reviewed the Patient Summary Reports.    I have reviewed the Nursing Notes. I have reviewed the NPO Status.   I have reviewed the Medications.     Review of Systems  Anesthesia Hx:  No problems with previous Anesthesia  History of prior surgery of interest to airway management or planning: Previous anesthesia: General 2016 D&C with general anesthesia.  Airway issues documented on chart review include laryngeal mask airway used  Denies Family Hx of Anesthesia complications.   Denies Personal Hx of Anesthesia complications.   Social:  Former Smoker    Hematology/Oncology:  Hematology Normal   Oncology Normal     Cardiovascular:   Hypertension, well controlled PVD (renal art stent) Echo EF 50%, PA 35   Pulmonary:  Pulmonary Normal    Renal/:   Chronic Renal Disease, CKD    Hepatic/GI:  Hepatic/GI Normal    Musculoskeletal:   Arthritis  Lumbar Spinal stenosis Spine Disorders: lumbar Degenerative disease    Neurological:  Neurology Normal    Endocrine:   Hypothyroidism Hashimoto's thyroiditis Morbid Obesity / BMI > 40  Psych:   Psychiatric History anxiety          Physical Exam  General:  Cooperative, Alert and Oriented      Airway/Jaw/Neck:  Airway Findings: Mouth Opening: Normal   Tongue: Normal   General Airway Assessment: Adult Mallampati: I  TM Distance: Normal, at least 6 cm          Dental:  Dental Findings: upper partial dentures               Anesthesia Plan  Type of Anesthesia, risks & benefits discussed:  Anesthesia Type:  Spinal    Patient's Preference:   Plan Factors:          Intra-op Monitoring Plan:   Intra-op Monitoring Plan Comments:   Post Op Pain Control Plan: multimodal analgesia  Post Op Pain Control Plan Comments:     Induction:   IV  Beta Blocker:         Informed Consent: Informed consent signed with  the Patient and all parties understand the risks and agree with anesthesia plan.  All questions answered.  Anesthesia consent signed with patient.  ASA Score: 3     Day of Surgery Review of History & Physical:        Anesthesia Plan Notes: Lab in care everywhere notable for creat 1.18, eGFR 50  Clearance sent for  Pt takes clonidine PRN, will bring         Ready For Surgery From Anesthesia Perspective.           Physical Exam  General: Cooperative, Alert and Oriented    Airway:  Mallampati: I   Mouth Opening: Normal  TM Distance: Normal, at least 6 cm  Tongue: Normal    Dental:  upper partial dentures          Anesthesia Plan  Type of Anesthesia, risks & benefits discussed:    Anesthesia Type: Spinal  Post Op Pain Control Plan: multimodal analgesia  Induction:  IV  Informed Consent: Informed consent signed with the Patient and all parties understand the risks and agree with anesthesia plan.  All questions answered.   ASA Score: 3  Anesthesia Plan Notes: Lab in care everywhere notable for creat 1.18, eGFR 50  Clearance sent for  Pt takes clonidine PRN, will bring     Ready For Surgery From Anesthesia Perspective.       .

## 2023-10-23 ENCOUNTER — HOSPITAL ENCOUNTER (OUTPATIENT)
Facility: OTHER | Age: 68
Discharge: HOME HEALTH | End: 2023-10-24
Attending: ORTHOPAEDIC SURGERY | Admitting: ORTHOPAEDIC SURGERY
Payer: MEDICARE

## 2023-10-23 ENCOUNTER — ANESTHESIA (OUTPATIENT)
Dept: SURGERY | Facility: OTHER | Age: 68
End: 2023-10-23
Payer: MEDICARE

## 2023-10-23 DIAGNOSIS — M16.11 PRIMARY OSTEOARTHRITIS OF RIGHT HIP: Primary | ICD-10-CM

## 2023-10-23 DIAGNOSIS — Z01.818 PREOP TESTING: ICD-10-CM

## 2023-10-23 LAB
ABO + RH BLD: NORMAL
BLD GP AB SCN CELLS X3 SERPL QL: NORMAL
SPECIMEN OUTDATE: NORMAL

## 2023-10-23 PROCEDURE — 97110 THERAPEUTIC EXERCISES: CPT

## 2023-10-23 PROCEDURE — 71000039 HC RECOVERY, EACH ADD'L HOUR: Performed by: ORTHOPAEDIC SURGERY

## 2023-10-23 PROCEDURE — D9220A PRA ANESTHESIA: ICD-10-PCS | Mod: ANES,,, | Performed by: ANESTHESIOLOGY

## 2023-10-23 PROCEDURE — 25000003 PHARM REV CODE 250: Performed by: NURSE ANESTHETIST, CERTIFIED REGISTERED

## 2023-10-23 PROCEDURE — 63600175 PHARM REV CODE 636 W HCPCS: Performed by: ORTHOPAEDIC SURGERY

## 2023-10-23 PROCEDURE — 25000003 PHARM REV CODE 250

## 2023-10-23 PROCEDURE — D9220A PRA ANESTHESIA: ICD-10-PCS | Mod: CRNA,,, | Performed by: NURSE ANESTHETIST, CERTIFIED REGISTERED

## 2023-10-23 PROCEDURE — 37000008 HC ANESTHESIA 1ST 15 MINUTES: Performed by: ORTHOPAEDIC SURGERY

## 2023-10-23 PROCEDURE — 36000710: Performed by: ORTHOPAEDIC SURGERY

## 2023-10-23 PROCEDURE — C1776 JOINT DEVICE (IMPLANTABLE): HCPCS | Performed by: ORTHOPAEDIC SURGERY

## 2023-10-23 PROCEDURE — 86900 BLOOD TYPING SEROLOGIC ABO: CPT | Performed by: ORTHOPAEDIC SURGERY

## 2023-10-23 PROCEDURE — 63600175 PHARM REV CODE 636 W HCPCS

## 2023-10-23 PROCEDURE — 97116 GAIT TRAINING THERAPY: CPT

## 2023-10-23 PROCEDURE — 37000009 HC ANESTHESIA EA ADD 15 MINS: Performed by: ORTHOPAEDIC SURGERY

## 2023-10-23 PROCEDURE — 63600175 PHARM REV CODE 636 W HCPCS: Performed by: ANESTHESIOLOGY

## 2023-10-23 PROCEDURE — 63600175 PHARM REV CODE 636 W HCPCS: Performed by: NURSE ANESTHETIST, CERTIFIED REGISTERED

## 2023-10-23 PROCEDURE — 25000003 PHARM REV CODE 250: Performed by: ORTHOPAEDIC SURGERY

## 2023-10-23 PROCEDURE — 94761 N-INVAS EAR/PLS OXIMETRY MLT: CPT

## 2023-10-23 PROCEDURE — 36415 COLL VENOUS BLD VENIPUNCTURE: CPT | Performed by: ORTHOPAEDIC SURGERY

## 2023-10-23 PROCEDURE — 36000711: Performed by: ORTHOPAEDIC SURGERY

## 2023-10-23 PROCEDURE — C9290 INJ, BUPIVACAINE LIPOSOME: HCPCS | Performed by: ORTHOPAEDIC SURGERY

## 2023-10-23 PROCEDURE — D9220A PRA ANESTHESIA: Mod: CRNA,,, | Performed by: NURSE ANESTHETIST, CERTIFIED REGISTERED

## 2023-10-23 PROCEDURE — D9220A PRA ANESTHESIA: Mod: ANES,,, | Performed by: ANESTHESIOLOGY

## 2023-10-23 PROCEDURE — C1713 ANCHOR/SCREW BN/BN,TIS/BN: HCPCS | Performed by: ORTHOPAEDIC SURGERY

## 2023-10-23 PROCEDURE — 25000003 PHARM REV CODE 250: Performed by: ANESTHESIOLOGY

## 2023-10-23 PROCEDURE — 27201423 OPTIME MED/SURG SUP & DEVICES STERILE SUPPLY: Performed by: ORTHOPAEDIC SURGERY

## 2023-10-23 PROCEDURE — 97530 THERAPEUTIC ACTIVITIES: CPT

## 2023-10-23 PROCEDURE — 97162 PT EVAL MOD COMPLEX 30 MIN: CPT

## 2023-10-23 PROCEDURE — 94799 UNLISTED PULMONARY SVC/PX: CPT

## 2023-10-23 PROCEDURE — 71000033 HC RECOVERY, INTIAL HOUR: Performed by: ORTHOPAEDIC SURGERY

## 2023-10-23 PROCEDURE — 25000003 PHARM REV CODE 250: Performed by: NURSE PRACTITIONER

## 2023-10-23 DEVICE — IMPLANTABLE DEVICE: Type: IMPLANTABLE DEVICE | Site: HIP | Status: FUNCTIONAL

## 2023-10-23 DEVICE — CEMENT REFOBACIN BCR 1X40: Type: IMPLANTABLE DEVICE | Site: HIP | Status: FUNCTIONAL

## 2023-10-23 RX ORDER — ONDANSETRON 8 MG/1
8 TABLET, ORALLY DISINTEGRATING ORAL EVERY 8 HOURS PRN
Qty: 20 TABLET | Refills: 1 | Status: SHIPPED | OUTPATIENT
Start: 2023-10-23

## 2023-10-23 RX ORDER — TIZANIDINE 4 MG/1
4 TABLET ORAL EVERY 8 HOURS PRN
Status: DISCONTINUED | OUTPATIENT
Start: 2023-10-23 | End: 2023-10-24 | Stop reason: HOSPADM

## 2023-10-23 RX ORDER — MEPERIDINE HYDROCHLORIDE 25 MG/ML
12.5 INJECTION INTRAMUSCULAR; INTRAVENOUS; SUBCUTANEOUS ONCE AS NEEDED
Status: DISCONTINUED | OUTPATIENT
Start: 2023-10-23 | End: 2023-10-23 | Stop reason: HOSPADM

## 2023-10-23 RX ORDER — OXYCODONE HYDROCHLORIDE 5 MG/1
5 TABLET ORAL
Status: DISCONTINUED | OUTPATIENT
Start: 2023-10-23 | End: 2023-10-23 | Stop reason: HOSPADM

## 2023-10-23 RX ORDER — ONDANSETRON 2 MG/ML
8 INJECTION INTRAMUSCULAR; INTRAVENOUS EVERY 8 HOURS PRN
Status: DISCONTINUED | OUTPATIENT
Start: 2023-10-23 | End: 2023-10-24 | Stop reason: HOSPADM

## 2023-10-23 RX ORDER — BISACODYL 10 MG
10 SUPPOSITORY, RECTAL RECTAL DAILY PRN
Status: DISCONTINUED | OUTPATIENT
Start: 2023-10-23 | End: 2023-10-24 | Stop reason: HOSPADM

## 2023-10-23 RX ORDER — DOCUSATE SODIUM 100 MG/1
100 CAPSULE, LIQUID FILLED ORAL EVERY 12 HOURS
Status: DISCONTINUED | OUTPATIENT
Start: 2023-10-23 | End: 2023-10-24 | Stop reason: HOSPADM

## 2023-10-23 RX ORDER — DIPHENHYDRAMINE HYDROCHLORIDE 50 MG/ML
25 INJECTION INTRAMUSCULAR; INTRAVENOUS EVERY 6 HOURS PRN
Status: DISCONTINUED | OUTPATIENT
Start: 2023-10-23 | End: 2023-10-24 | Stop reason: HOSPADM

## 2023-10-23 RX ORDER — HYDROMORPHONE HYDROCHLORIDE 2 MG/ML
0.5 INJECTION, SOLUTION INTRAMUSCULAR; INTRAVENOUS; SUBCUTANEOUS EVERY 4 HOURS PRN
Status: ACTIVE | OUTPATIENT
Start: 2023-10-23 | End: 2023-10-24

## 2023-10-23 RX ORDER — BUPIVACAINE HYDROCHLORIDE AND EPINEPHRINE 2.5; 5 MG/ML; UG/ML
INJECTION, SOLUTION EPIDURAL; INFILTRATION; INTRACAUDAL; PERINEURAL
Status: DISCONTINUED | OUTPATIENT
Start: 2023-10-23 | End: 2023-10-23 | Stop reason: HOSPADM

## 2023-10-23 RX ORDER — POLYETHYLENE GLYCOL 3350 17 G/17G
17 POWDER, FOR SOLUTION ORAL DAILY
Status: DISCONTINUED | OUTPATIENT
Start: 2023-10-23 | End: 2023-10-24 | Stop reason: HOSPADM

## 2023-10-23 RX ORDER — LOSARTAN POTASSIUM 50 MG/1
100 TABLET ORAL DAILY
Status: DISCONTINUED | OUTPATIENT
Start: 2023-10-24 | End: 2023-10-24 | Stop reason: HOSPADM

## 2023-10-23 RX ORDER — DEXTROSE MONOHYDRATE AND SODIUM CHLORIDE 5; .9 G/100ML; G/100ML
INJECTION, SOLUTION INTRAVENOUS CONTINUOUS
Status: DISCONTINUED | OUTPATIENT
Start: 2023-10-23 | End: 2023-10-24 | Stop reason: HOSPADM

## 2023-10-23 RX ORDER — PHENYLEPHRINE HYDROCHLORIDE 10 MG/ML
INJECTION INTRAVENOUS
Status: DISCONTINUED | OUTPATIENT
Start: 2023-10-23 | End: 2023-10-23

## 2023-10-23 RX ORDER — CELECOXIB 200 MG/1
200 CAPSULE ORAL 2 TIMES DAILY
Status: DISCONTINUED | OUTPATIENT
Start: 2023-10-23 | End: 2023-10-23

## 2023-10-23 RX ORDER — SODIUM CHLORIDE 0.9 % (FLUSH) 0.9 %
2 SYRINGE (ML) INJECTION EVERY 6 HOURS PRN
Status: DISCONTINUED | OUTPATIENT
Start: 2023-10-23 | End: 2023-10-24 | Stop reason: HOSPADM

## 2023-10-23 RX ORDER — OXYCODONE HYDROCHLORIDE 5 MG/1
5 TABLET ORAL EVERY 4 HOURS PRN
Status: DISCONTINUED | OUTPATIENT
Start: 2023-10-23 | End: 2023-10-24 | Stop reason: HOSPADM

## 2023-10-23 RX ORDER — NAPROXEN SODIUM 220 MG/1
81 TABLET, FILM COATED ORAL 2 TIMES DAILY
Qty: 60 TABLET | Refills: 0 | Status: SHIPPED | OUTPATIENT
Start: 2023-10-23

## 2023-10-23 RX ORDER — ACETAMINOPHEN 500 MG
1000 TABLET ORAL EVERY 8 HOURS
Status: DISPENSED | OUTPATIENT
Start: 2023-10-23 | End: 2023-10-24

## 2023-10-23 RX ORDER — ALPRAZOLAM 0.25 MG/1
0.25 TABLET ORAL 2 TIMES DAILY PRN
Status: DISCONTINUED | OUTPATIENT
Start: 2023-10-23 | End: 2023-10-24 | Stop reason: HOSPADM

## 2023-10-23 RX ORDER — OXYCODONE HYDROCHLORIDE 10 MG/1
10 TABLET ORAL EVERY 4 HOURS PRN
Status: DISCONTINUED | OUTPATIENT
Start: 2023-10-23 | End: 2023-10-24 | Stop reason: HOSPADM

## 2023-10-23 RX ORDER — ROPIVACAINE HYDROCHLORIDE 5 MG/ML
INJECTION, SOLUTION EPIDURAL; INFILTRATION; PERINEURAL
Status: COMPLETED | OUTPATIENT
Start: 2023-10-23 | End: 2023-10-23

## 2023-10-23 RX ORDER — FAMOTIDINE 20 MG/1
20 TABLET, FILM COATED ORAL
Status: COMPLETED | OUTPATIENT
Start: 2023-10-23 | End: 2023-10-23

## 2023-10-23 RX ORDER — NAPROXEN SODIUM 220 MG/1
81 TABLET, FILM COATED ORAL 2 TIMES DAILY
Status: DISCONTINUED | OUTPATIENT
Start: 2023-10-24 | End: 2023-10-24 | Stop reason: HOSPADM

## 2023-10-23 RX ORDER — PREGABALIN 75 MG/1
75 CAPSULE ORAL DAILY
Status: DISCONTINUED | OUTPATIENT
Start: 2023-10-23 | End: 2023-10-24 | Stop reason: HOSPADM

## 2023-10-23 RX ORDER — PROCHLORPERAZINE EDISYLATE 5 MG/ML
5 INJECTION INTRAMUSCULAR; INTRAVENOUS EVERY 30 MIN PRN
Status: DISCONTINUED | OUTPATIENT
Start: 2023-10-23 | End: 2023-10-23 | Stop reason: HOSPADM

## 2023-10-23 RX ORDER — ATORVASTATIN CALCIUM 20 MG/1
80 TABLET, FILM COATED ORAL NIGHTLY
Status: DISCONTINUED | OUTPATIENT
Start: 2023-10-23 | End: 2023-10-24 | Stop reason: HOSPADM

## 2023-10-23 RX ORDER — MUPIROCIN 20 MG/G
OINTMENT TOPICAL 2 TIMES DAILY
Status: DISCONTINUED | OUTPATIENT
Start: 2023-10-23 | End: 2023-10-24 | Stop reason: HOSPADM

## 2023-10-23 RX ORDER — METOCLOPRAMIDE HYDROCHLORIDE 5 MG/ML
5 INJECTION INTRAMUSCULAR; INTRAVENOUS EVERY 6 HOURS PRN
Status: DISCONTINUED | OUTPATIENT
Start: 2023-10-23 | End: 2023-10-24 | Stop reason: HOSPADM

## 2023-10-23 RX ORDER — HYDROMORPHONE HYDROCHLORIDE 2 MG/ML
0.4 INJECTION, SOLUTION INTRAMUSCULAR; INTRAVENOUS; SUBCUTANEOUS EVERY 5 MIN PRN
Status: DISCONTINUED | OUTPATIENT
Start: 2023-10-23 | End: 2023-10-23 | Stop reason: HOSPADM

## 2023-10-23 RX ORDER — CEFAZOLIN SODIUM 1 G/3ML
2 INJECTION, POWDER, FOR SOLUTION INTRAMUSCULAR; INTRAVENOUS
Status: COMPLETED | OUTPATIENT
Start: 2023-10-23 | End: 2023-10-23

## 2023-10-23 RX ORDER — SODIUM CHLORIDE, SODIUM LACTATE, POTASSIUM CHLORIDE, CALCIUM CHLORIDE 600; 310; 30; 20 MG/100ML; MG/100ML; MG/100ML; MG/100ML
INJECTION, SOLUTION INTRAVENOUS CONTINUOUS
Status: DISCONTINUED | OUTPATIENT
Start: 2023-10-23 | End: 2023-10-24 | Stop reason: HOSPADM

## 2023-10-23 RX ORDER — FAMOTIDINE 20 MG/1
20 TABLET, FILM COATED ORAL 2 TIMES DAILY
Status: DISCONTINUED | OUTPATIENT
Start: 2023-10-23 | End: 2023-10-24 | Stop reason: HOSPADM

## 2023-10-23 RX ORDER — KETOROLAC TROMETHAMINE 30 MG/ML
INJECTION, SOLUTION INTRAMUSCULAR; INTRAVENOUS
Status: DISCONTINUED | OUTPATIENT
Start: 2023-10-23 | End: 2023-10-23

## 2023-10-23 RX ORDER — PROPOFOL 10 MG/ML
VIAL (ML) INTRAVENOUS
Status: DISCONTINUED | OUTPATIENT
Start: 2023-10-23 | End: 2023-10-23

## 2023-10-23 RX ORDER — ACETAMINOPHEN 500 MG
1000 TABLET ORAL
Status: COMPLETED | OUTPATIENT
Start: 2023-10-23 | End: 2023-10-23

## 2023-10-23 RX ORDER — CARVEDILOL 12.5 MG/1
12.5 TABLET ORAL 2 TIMES DAILY WITH MEALS
Status: DISCONTINUED | OUTPATIENT
Start: 2023-10-23 | End: 2023-10-24 | Stop reason: HOSPADM

## 2023-10-23 RX ORDER — OXYCODONE AND ACETAMINOPHEN 5; 325 MG/1; MG/1
TABLET ORAL
Qty: 60 TABLET | Refills: 0 | Status: SHIPPED | OUTPATIENT
Start: 2023-10-23

## 2023-10-23 RX ORDER — ONDANSETRON 2 MG/ML
INJECTION INTRAMUSCULAR; INTRAVENOUS
Status: DISCONTINUED | OUTPATIENT
Start: 2023-10-23 | End: 2023-10-23

## 2023-10-23 RX ORDER — FENTANYL CITRATE 50 UG/ML
INJECTION, SOLUTION INTRAMUSCULAR; INTRAVENOUS
Status: DISCONTINUED | OUTPATIENT
Start: 2023-10-23 | End: 2023-10-23

## 2023-10-23 RX ORDER — LAMOTRIGINE 25 MG/1
25 TABLET ORAL 2 TIMES DAILY
Status: DISCONTINUED | OUTPATIENT
Start: 2023-10-23 | End: 2023-10-24 | Stop reason: HOSPADM

## 2023-10-23 RX ORDER — SODIUM CHLORIDE 0.9 % (FLUSH) 0.9 %
3 SYRINGE (ML) INJECTION
Status: DISCONTINUED | OUTPATIENT
Start: 2023-10-23 | End: 2023-10-23 | Stop reason: HOSPADM

## 2023-10-23 RX ORDER — PROPOFOL 10 MG/ML
VIAL (ML) INTRAVENOUS CONTINUOUS PRN
Status: DISCONTINUED | OUTPATIENT
Start: 2023-10-23 | End: 2023-10-23

## 2023-10-23 RX ORDER — TRANEXAMIC ACID 100 MG/ML
INJECTION, SOLUTION INTRAVENOUS
Status: DISCONTINUED | OUTPATIENT
Start: 2023-10-23 | End: 2023-10-23

## 2023-10-23 RX ORDER — CLONIDINE HYDROCHLORIDE 0.1 MG/1
0.1 TABLET ORAL EVERY 6 HOURS PRN
Status: DISCONTINUED | OUTPATIENT
Start: 2023-10-23 | End: 2023-10-24 | Stop reason: HOSPADM

## 2023-10-23 RX ORDER — LIDOCAINE HYDROCHLORIDE 10 MG/ML
0.5 INJECTION, SOLUTION EPIDURAL; INFILTRATION; INTRACAUDAL; PERINEURAL ONCE
Status: DISCONTINUED | OUTPATIENT
Start: 2023-10-23 | End: 2023-10-23 | Stop reason: HOSPADM

## 2023-10-23 RX ORDER — LIDOCAINE HYDROCHLORIDE 20 MG/ML
INJECTION INTRAVENOUS
Status: DISCONTINUED | OUTPATIENT
Start: 2023-10-23 | End: 2023-10-23

## 2023-10-23 RX ADMIN — ONDANSETRON HYDROCHLORIDE 4 MG: 2 INJECTION INTRAMUSCULAR; INTRAVENOUS at 09:10

## 2023-10-23 RX ADMIN — PHENYLEPHRINE HYDROCHLORIDE 100 MCG: 10 INJECTION INTRAVENOUS at 11:10

## 2023-10-23 RX ADMIN — GLYCOPYRROLATE 0.2 MG: 0.2 INJECTION, SOLUTION INTRAMUSCULAR; INTRAVITREAL at 10:10

## 2023-10-23 RX ADMIN — PHENYLEPHRINE HYDROCHLORIDE 100 MCG: 10 INJECTION INTRAVENOUS at 10:10

## 2023-10-23 RX ADMIN — CEFAZOLIN 2 G: 2 INJECTION, POWDER, FOR SOLUTION INTRAMUSCULAR; INTRAVENOUS at 08:10

## 2023-10-23 RX ADMIN — ROPIVACAINE HYDROCHLORIDE 3 ML: 5 INJECTION, SOLUTION EPIDURAL; INFILTRATION; PERINEURAL at 10:10

## 2023-10-23 RX ADMIN — TRANEXAMIC ACID 2000 MG: 100 INJECTION, SOLUTION INTRAVENOUS at 09:10

## 2023-10-23 RX ADMIN — DOCUSATE SODIUM 100 MG: 100 CAPSULE, LIQUID FILLED ORAL at 08:10

## 2023-10-23 RX ADMIN — ACETAMINOPHEN 1000 MG: 500 TABLET ORAL at 03:10

## 2023-10-23 RX ADMIN — DEXTROSE MONOHYDRATE 1 G: 2.5 INJECTION INTRAVENOUS at 09:10

## 2023-10-23 RX ADMIN — Medication 10 MG: at 10:10

## 2023-10-23 RX ADMIN — DEXTROSE AND SODIUM CHLORIDE: 5; 900 INJECTION, SOLUTION INTRAVENOUS at 02:10

## 2023-10-23 RX ADMIN — FAMOTIDINE 20 MG: 20 TABLET ORAL at 08:10

## 2023-10-23 RX ADMIN — PROPOFOL 75 MCG/KG/MIN: 10 INJECTION, EMULSION INTRAVENOUS at 10:10

## 2023-10-23 RX ADMIN — ALPRAZOLAM 0.25 MG: 0.25 TABLET ORAL at 08:10

## 2023-10-23 RX ADMIN — FAMOTIDINE 20 MG: 20 TABLET ORAL at 09:10

## 2023-10-23 RX ADMIN — ACETAMINOPHEN 1000 MG: 500 TABLET ORAL at 09:10

## 2023-10-23 RX ADMIN — ATORVASTATIN CALCIUM 80 MG: 20 TABLET, FILM COATED ORAL at 08:10

## 2023-10-23 RX ADMIN — OXYCODONE HYDROCHLORIDE 10 MG: 10 TABLET ORAL at 06:10

## 2023-10-23 RX ADMIN — SODIUM CHLORIDE, SODIUM LACTATE, POTASSIUM CHLORIDE, AND CALCIUM CHLORIDE: 600; 310; 30; 20 INJECTION, SOLUTION INTRAVENOUS at 11:10

## 2023-10-23 RX ADMIN — OXYCODONE HYDROCHLORIDE 5 MG: 5 TABLET ORAL at 12:10

## 2023-10-23 RX ADMIN — ONDANSETRON HYDROCHLORIDE 4 MG: 2 INJECTION INTRAMUSCULAR; INTRAVENOUS at 10:10

## 2023-10-23 RX ADMIN — CEFAZOLIN 3 G: 330 INJECTION, POWDER, FOR SOLUTION INTRAMUSCULAR; INTRAVENOUS at 10:10

## 2023-10-23 RX ADMIN — MUPIROCIN: 20 OINTMENT TOPICAL at 08:10

## 2023-10-23 RX ADMIN — SODIUM CHLORIDE, SODIUM LACTATE, POTASSIUM CHLORIDE, AND CALCIUM CHLORIDE: 600; 310; 30; 20 INJECTION, SOLUTION INTRAVENOUS at 09:10

## 2023-10-23 RX ADMIN — PHENYLEPHRINE HYDROCHLORIDE 200 MCG: 10 INJECTION INTRAVENOUS at 11:10

## 2023-10-23 RX ADMIN — PREGABALIN 75 MG: 75 CAPSULE ORAL at 06:10

## 2023-10-23 RX ADMIN — VANCOMYCIN HYDROCHLORIDE 1500 MG: 1.5 INJECTION, POWDER, LYOPHILIZED, FOR SOLUTION INTRAVENOUS at 09:10

## 2023-10-23 RX ADMIN — FENTANYL CITRATE 100 MCG: 50 INJECTION, SOLUTION INTRAMUSCULAR; INTRAVENOUS at 10:10

## 2023-10-23 RX ADMIN — VANCOMYCIN HYDROCHLORIDE 1500 MG: 1.5 INJECTION, POWDER, LYOPHILIZED, FOR SOLUTION INTRAVENOUS at 10:10

## 2023-10-23 RX ADMIN — LAMOTRIGINE 25 MG: 25 TABLET ORAL at 08:10

## 2023-10-23 RX ADMIN — LIDOCAINE HYDROCHLORIDE 50 MG: 20 INJECTION, SOLUTION INTRAVENOUS at 10:10

## 2023-10-23 RX ADMIN — TIZANIDINE 4 MG: 4 TABLET ORAL at 08:10

## 2023-10-23 NOTE — PLAN OF CARE
Problem: Physical Therapy  Goal: Physical Therapy Goal  Description: Goals to be met by: 2023     Patient will increase functional independence with mobility by performin. Supine to sit with supervision.   2. Sit to supine with supervision.   3. Sit<>stand transfer with supervision using rolling walker.   4. Gait > 150 feet with SBA using rolling walker.   5. Ascend/descend 4 stairs with one sided handrails with CGA no AD.  6. Verbalize posterior hip precautions without verbal cues.     Outcome: Ongoing, Progressing       Patient evaluated by PT and goals established. Patient with minimal pain during therapy session. Review of posterior hip precautions performed with verbal and visual demo with pt requiring moderate cueing for adherence during OOB mobility. Bed mobility with minimal assist , sit<>stand with CGA with RW, and amb x70ft with RW and CGA.  PT will continue to follow and progress as tolerated. Rec for d/c to moderate intensity therapy. Please see progress note for detailed plan of care and recommendations.

## 2023-10-23 NOTE — CARE UPDATE
10/23/23 1703   PRE-TX-O2   Device (Oxygen Therapy) room air   SpO2 98 %   Pulse Oximetry Type Intermittent   $ Pulse Oximetry - Multiple Charge Pulse Oximetry - Multiple   Incentive Spirometer   $ Incentive Spirometer Charges done with encouragement   Administration (IS) instruction provided, initial   Number of Repetitions (IS) 10   Level Incentive Spirometer (mL) 1500   Patient Tolerance (IS) good;no adverse signs/symptoms present

## 2023-10-23 NOTE — PT/OT/SLP EVAL
Physical Therapy Evaluation and Treatment    Patient Name: Tori Hercules   MRN: [id number]  Recent Surgery: Procedure(s) (LRB):  ARTHROPLASTY, HIP, TOTAL, POSTERIOR APPROACH (Right) Day of Surgery    Recommendations:     Discharge Recommendations: Moderate Intensity Therapy   Discharge Equipment Recommendations: walker, rolling, bedside commode   Barriers to discharge: Increased level of assist, Inaccessible home, and Decreased caregiver support    Assessment:     Tori Hercules is a 68 y.o. female admitted with a medical diagnosis of Unilateral primary osteoarthritis, right hip. She presents with the following impairments/functional limitations: weakness, impaired balance, impaired skin, impaired endurance, pain, impaired cardiopulmonary response to activity, impaired self care skills, impaired functional mobility, gait instability, decreased lower extremity function, decreased ROM, impaired joint extensibility, orthopedic precautions.     Patient evaluated by PT and goals established. Patient with minimal pain during therapy session. Review of posterior hip precautions performed with verbal and visual demo with pt requiring moderate cueing for adherence during OOB mobility. Bed mobility with minimal assist , sit<>stand with CGA with RW, and amb x70ft with RW and CGA.  PT will continue to follow and progress as tolerated. Rec for d/c to moderate intensity therapy. Please see progress note for detailed plan of care and recommendations    Rehab Prognosis: Good; patient would benefit from acute PT services to address these deficits and reach maximum level of function.    Plan:     During this hospitalization, patient to be seen BID to address the above listed problems via gait training, therapeutic activities, therapeutic exercises    Plan of Care Expires: 11/02/23    Subjective     Chief Complaint: discomfort in right hip  Patient Comments/Goals: patient agrees to participate in PT intervention.    Pain/Comfort:  Pain Rating 1: 2/10  Location - Side 1: Right  Location 1: hip  Pain Addressed 1: Reposition, Cessation of Activity, Distraction    Social History:  Living Environment: Patient lives alone in a single story home with number of outside stair(s): 4-5 with railing on one side   Prior Level of Function: Prior to admission, patient was modified independent  Equipment Used at Home: cane, straight  DME owned (not currently used): none  Assistance Upon Discharge:  she reports she will be staying with her sister for some time     Objective:     Communicated with nursing prior to session. Patient found supine with peripheral IV, cheung catheter, hip abduction pillow, MICHAEL drain, pulse ox (continuous) upon PT entry to room.    General Precautions: Standard, fall   Orthopedic Precautions: RLE posterior precautions   Braces: N/A    Respiratory Status: Room air    Exams:  Cognition:   Patient is oriented to name, , date, place, situation.  Pt follows approximately 100% of one step commands.    Mood: Pleasant and cooperative.   Musculoskeletal:  Posture: Protective guarding surgical joint  LE ROM/Strength: 5/5 bilateral ankle dorsiflexion and plantarflexion, nonsurgical hip flexion and extension, and non surgical knee flexion and extension. Surgical hip flexion/extension and knee flexion/ext grossly 3+/5 but formal MMT deferred at this time. AROM surgical hip flexion limited to 90 degrees. AROM nonsurgical extremity WFL.  Neuromuscular:  Sensation: Intact to light touch bilateral LEs. Pt denied paresthesias.   Coordination/Tone/Reflexes: No impairments identified with functional mobility. No formal testing performed.   Balance: CGA for dynamic standing with bilateral UE support.   Visual-vestibular: No impairments identified with functional mobility. No formal testing performed.  Integument:  Visible skin intact and surgical extremity dressing clean and dry.   Cardiopulmonary:  Edema: Mild surgical extremity.     Color/temperature/pulses: normal      Functional Mobility:  Gait belt applied - Yes  The patient requires increased time to perform all functional tasks  Bed Mobility  Supine to Sit: minimum assistance for LE management  Transfers  Sit to Stand: contact guard assistance with rolling walker and with cues for hand placement and foot placement  Gait  Patient ambulated 70 feet  with rolling walker and contact guard assistance. Patient required cues for decreasing toeing in, heel strike, position in walker, scapular depression, sequencing, step through gait pattern, stride length, upright posture, and width of base of support to increase independence and safety. Patient required cues ~ 50% of the time.  Balance  Sitting: GOOD+: Maintains balance through MAXIMAL excursions of active trunk motion  Standing: FAIR: Needs CONTACT GUARD during gait      Therapeutic Activities and Exercises:  Patient educated on role of acute care PT and PT POC, safety while in hospital including calling nurse for mobility, and call light usage.  Reviewed  posterior hip surgical precautions  Pt performed supine therapeutic exercises for strengthening and to promote circulation, pressure relief, and functional ROM with verbal, visual and tactile cues for correct performance including:   Bilateral ankle pumps x 10 reps  Bilateral quad sets x 10 reps  Bilateral glute sets x 10 reps       AM-PAC 6 CLICK MOBILITY  Total Score:18    Patient left up in chair with all lines intact and call button in reach.    GOALS:   Multidisciplinary Problems       Physical Therapy Goals          Problem: Physical Therapy    Goal Priority Disciplines Outcome Goal Variances Interventions   Physical Therapy Goal     PT, PT/OT Ongoing, Progressing     Description: Goals to be met by: 2023     Patient will increase functional independence with mobility by performin. Supine to sit with supervision.   2. Sit to supine with supervision.   3. Sit<>stand  transfer with supervision using rolling walker.   4. Gait > 150 feet with SBA using rolling walker.   5. Ascend/descend 4 stairs with one sided handrails with CGA no AD.  6. Verbalize posterior hip precautions without verbal cues.                          History:     Past Medical History:   Diagnosis Date    ADD (attention deficit disorder) without hyperactivity     Endometriosis     Generalized anxiety disorder     Herpes simplex virus (HSV) infection     Hypertension     Hypothyroidism     Hashimoto's    Osteopenia     Renal disorder     Vulvar atrophy        Past Surgical History:   Procedure Laterality Date    LAPAROSCOPY W/ MINI-LAPAROTOMY  1990    Endometriosis    RENAL ARTERY STENT      2018    TONSILLECTOMY  1960       Time Tracking:     PT Received On: 10/23/23  PT Start Time: 1458  PT Stop Time: 1540  PT Total Time (min): 42 min     Billable Minutes: Evaluation 4 minutes, Gait Training 18, Therapeutic Activity 10, and Therapeutic Exercise 10    10/23/2023

## 2023-10-23 NOTE — PLAN OF CARE
[facility]  [address]  [place] [address]  [phone]               [facility] Orthopedic Discharge Orders    Home Cj           Expected Discharge Date: 10/24/23    Diagnoses:  Post-op  right hip(s) replacement    Patient is homebound due to:   Pt requires home health services due to taxing effort to leave the home as a result of immobility from Post-op right hip(s) replacement    Weight Bearing Status:   full weight bearing: right leg     Posterior Hip Precautions for 6 weeks, AVOID:  -Avoid greater than 90 degrees of flexion, internal rotation, and adduction  -Must sleep with hip abduction pillow or regular pillow b/t legs    Physical Therapy   3 times a week for 2 weeks (Call for further orders)  - Ambulate with a rolling walker  - Progress to cane  -Instruct on ROM and strengthening of knee  -Set up for outpt once staples removed and MOD 1 with cane    OT eval and treat 2w2  HHA 2w2  Wound Care:   KAILA negative pressure dsg in place for 7 days and then discard and cover incision with island dsg.If the Kaila dsg leaks, replace with another kaila dsg that was sent home with the patient.After 7 days,Cleanse with wound cleanser or normal saline and apply island dressing.  If island dressing not available apply gauze and tegaderm.  Change surgical dressing 3 times a week and PRN  in standing position.  Teach patient to change daily if draining.  Pt may shower if surgical dressing is waterproof.. Removal and replacement of dressing after shower only needed if incision is suspected to have gotten wet during shower.  Otherwise change as previously described depending on dressing/drainage. No soaking in the tub or hot tub  for 6 weeks.    Wear  TEDS Bilateral Knee High Stockings for 3  Weeks. OK to remove stockings 1-2 hours/day max if desired.    PT/SN to remove staples 14 days Post-op and apply skin prep and steri-strips.    Cold therapy/Ice: encouraged at least 20 minutes 2-3 times  daily or more if desired.  Incision must be kept waterproof while icing.      Contact:  Please contact Dr Dez Strange [phone] with concerns.    BLOOD THINNER:    If sent home on Xarelto         -14 days post-op for TKR       -30 days post-op for THR     If sent home home on ASA    81mg   BID x 4 weeks     Once finished with prescribed blood thinner, patients can return to pre-surgical ASA dosage if they took ASA before surgery.     Outpatient Therapy: [facility]    [address] [address]   or  [address] [place] [address]    Call [phone] to schedule appointment  Fax [phone]    If need orders: Call Sherri at Ext [phone]        DME:  - rolling Walker  - 3 in 1 commode  - tub bench / shower chair  - Hip Kit  - Per PT/OT recommendation  - Other:King Strange

## 2023-10-23 NOTE — ANESTHESIA POSTPROCEDURE EVALUATION
Anesthesia Post Evaluation    Patient: Tori Hercules    Procedure(s) Performed: Procedure(s) (LRB):  ARTHROPLASTY, HIP, TOTAL, POSTERIOR APPROACH (Right)    Final Anesthesia Type: spinal      Patient location during evaluation: PACU  Patient participation: Yes- Able to Participate  Level of consciousness: awake and alert  Post-procedure vital signs: reviewed and stable  Pain management: adequate  Airway patency: patent    PONV status at discharge: No PONV  Anesthetic complications: no      Cardiovascular status: hemodynamically stable  Respiratory status: unassisted  Hydration status: euvolemic  Follow-up not needed.          Vitals Value Taken Time   /64 10/23/23 1346   Temp 36.5 °C (97.7 °F) 10/23/23 1214   Pulse 57 10/23/23 1400   Resp 16 10/23/23 1400   SpO2 95 % 10/23/23 1400   Vitals shown include unvalidated device data.      Event Time   Out of Recovery 14:09:00         Pain/Jessica Score: Pain Rating Prior to Med Admin: 0 (10/23/2023 12:40 PM)  Jessica Score: 10 (10/23/2023  2:00 PM)

## 2023-10-23 NOTE — TRANSFER OF CARE
"Anesthesia Transfer of Care Note    Patient: Tori Hercules    Procedure(s) Performed: Procedure(s) (LRB):  ARTHROPLASTY, HIP, TOTAL, POSTERIOR APPROACH (Right)    Patient location: PACU    Anesthesia Type: spinal    Transport from OR: Transported from OR on 2-3 L/min O2 by NC with adequate spontaneous ventilation    Post pain: adequate analgesia    Post assessment: no apparent anesthetic complications    Post vital signs: stable    Level of consciousness: awake, alert and oriented    Nausea/Vomiting: no nausea/vomiting    Complications: none    Transfer of care protocol was followed      Last vitals:   Visit Vitals  BP (!) 116/57 (BP Location: Left arm, Patient Position: Lying)   Pulse 63   Temp 36.5 °C (97.7 °F) (Skin)   Resp 16   Ht 5' 4" (1.626 m)   Wt 108 kg (238 lb)   LMP 11/02/2016   SpO2 97%   Breastfeeding No   BMI 40.85 kg/m²     "

## 2023-10-23 NOTE — OR NURSING
Unable to attain peripheral IV for vancomycin. Holding will start IV. Will send vancomycin to holding area to scan and start.

## 2023-10-23 NOTE — ANESTHESIA PROCEDURE NOTES
Spinal    Diagnosis: Osteo hip  Patient location during procedure: holding area  Start time: 10/23/2023 10:00 AM  Timeout: 10/23/2023 10:00 AM  End time: 10/23/2023 10:05 AM    Staffing  Authorizing Provider: Edvin Condon MD  Performing Provider: Edvin Condon MD    Staffing  Performed by: Edvin Condon MD  Authorized by: Edvin Condon MD    Preanesthetic Checklist  Completed: patient identified, IV checked, site marked, risks and benefits discussed, surgical consent, monitors and equipment checked, pre-op evaluation and timeout performed  Spinal Block  Patient position: sitting  Prep: ChloraPrep  Patient monitoring: heart rate, cardiac monitor, continuous pulse ox and frequent blood pressure checks  Approach: midline  Location: L3-4  Injection technique: single shot  CSF Fluid: clear free-flowing CSF  Needle  Needle type: pencil-tip   Needle gauge: 22 G  Needle length: 3.5 in  Additional Documentation: incremental injection, negative aspiration for heme and no paresthesia on injection  Needle localization: anatomical landmarks  Assessment  Sensory level: T5   Dermatomal levels determined by alcohol wipe  Ease of block: easy  Patient's tolerance of the procedure: comfortable throughout block and no complaints  Medications:    Medications: ropivacaine (NAROPIN) injection 0.5% - Intraspinal   3 mL - 10/23/2023 10:04:00 AM

## 2023-10-23 NOTE — OR NURSING
VSS on RA. Pt denies pain. Dressing C/D/I; HUMBERTO drain and accordion drain intact. Urbina in place and draining. Abduction pillow in place. Spinal progressing appropriately - see Flowsheet for details. PIV C/D/I. Post-op Xray complete. Meets PACU discharge criteria, ready for transfer to . Report given to receiving RN. No family/friends notified per pt request. Belongings transferred with pt (phone, glasses, cane, clothes).

## 2023-10-23 NOTE — OP NOTE
[facility]  Operative Report    SUMMARY     Surgery Date: 10/23/2023     Surgeon(s) and Role:     * Dez Strange MD - Primary    Assistant: LIZBETH Barlow    Pre-op Diagnosis:  Unilateral primary osteoarthritis, right hip [M16.11]    Post-op Diagnosis:  Post-Op Diagnosis Codes:     * Unilateral primary osteoarthritis, right hip [M16.11]    Procedure(s) (LRB):  ARTHROPLASTY, HIP, TOTAL, POSTERIOR APPROACH (Right) (Shila hybrid)    Anesthesia: Spinal    Description of Procedure: Appropriate consent was signed. The patient understood   and accepted all risks and complications. The patient was brought to the Operating Room after undergoing spinal anesthetic. Urbina catheter was placed. The patient was then placed in a lateral decubitus position on a peg board with all bony   prominences padded. Perineal drape was applied. The Operative hip and lower extremity were then prepped and draped in a sterile manner and a mini posterior approach was utilized. Dissection was taken down to fascia, which was incised and   gluteus dania muscle split in line of its fibers.The Charnley retractor was then   placed and the hip internally rotated. The external rotators and capsule were   taken off as one flap and peeled back to protect the sciatic nerve. It was   tagged with #5 Ethibond suture. Leg was then levelled and 2 pins were placed, 1  in the greater trochanter and 1 in the iliac crest and distance measured 12.0  cm. Pin was then removed from greater trochanter and hip dislocated. A femoral  neck osteotomy was performed in 20 mm above the lesser trochanter as   templated on preoperative x-rays. Femoral head was removed and proximal femur   was exposed. It was opened up with the cookie cutter, starter reamer and   lateralizing reamer. Gold  metal reamers were utilized up to 12 mm and   broaching was performed to 12 mm. A thrombin-soaked sponge is placed in the   proximal femur and attention was then directed to  the acetabulum.    Labrum and soft tissue were excised and reaming was begun with a 45 mm reamer   and progressed to 49 mm. A 50 mm press-fit Trilogy metal cup with cluster   holes was then impacted obtaining excellent fixation. 2 dome screws were placed  enhancing fixation. A 36 mm neutral highly cross-linked polyethylene liner was  then snapped in the place and checked for loosening. Osteophytes were removed   from around the acetabulum.    Attention was then directed back to the proximal femur where the trial 12 mm   metal broach was placed and trials were performed. An extended neck   was required along with a 36 mm head plus 3.5 mm neck. Leg length measured 12.6   cm. Broach is then removed and a cement restrictor was placed down the canal and the canal washed out with the Pulsavac and canal brush.  It was dried with a sterile tampon.  Palacos cement was then mixed and pressurized in the proximal femur.  The 12 mm Shila Advocate stem was placed down the canal and held in 20° of anteversion.  Excess cement was removed.  It was held in position till the cement hardened.   A 36 mm   cobalt chrome head +3.5 mm neck was then impacted on the dried trunnion   relocated brought through full range of motion and found to be quite stable.   The hip was then washed out copiously with antibiotic solution through the   Pulsavac. The external rotators and capsule were reattached to trochanteric   attachment through drill holes utilizing #5 Ethibond suture. Exparel cocktail   was injected into the fascia and subcutaneous tissue. Fascia was closed with a   running #2 Quill suture over a drain. Subcutaneous closure was obtained with #1 and 2-0   Vicryl. Skin was then closed with staples and a sterile compressive kaila dressing   was applied. The patient was placed in abduction pillow and brought to Recovery  Room in good condition.    Estimated Blood Loss: 300cc         Specimens:   Specimen (24h ago, onward)      None

## 2023-10-23 NOTE — CONSULTS
Consult Note  Nephrology    Consult Requested By: Dez Strange MD  Reason for Consult: CKD III/HTN/Anxiety/Hypothyroid    SUBJECTIVE:     History of Present Illness:  Patient is a 68 y.o. female s/p scheduled right hip repair.  Seen post op in PACU and again on floor. She has participated with therapy without issues. She does have some mild pain in operative  leg and groin area.   Pre-op/Epic reviewed.  VSS.   Denies CP, SOB, Palps N/V, Calf pain, rash.       Past Medical History:   Diagnosis Date    ADD (attention deficit disorder) without hyperactivity     Endometriosis     Generalized anxiety disorder     Herpes simplex virus (HSV) infection     Hypertension     Hypothyroidism     Hashimoto's    Osteopenia     Renal disorder     Vulvar atrophy      Past Surgical History:   Procedure Laterality Date    LAPAROSCOPY W/ MINI-LAPAROTOMY      Endometriosis    RENAL ARTERY STENT      2018    TONSILLECTOMY  1960     Family History   Problem Relation Age of Onset    Breast cancer Cousin     Ovarian cancer Neg Hx      Social History     Tobacco Use    Smoking status: Former     Current packs/day: 0.00     Types: Cigarettes     Quit date: 1996     Years since quittin.8   Substance Use Topics    Alcohol use: Yes     Comment: socially    Drug use: No       Review of patient's allergies indicates:   Allergen Reactions    Amlodipine Swelling    Iodinated contrast media Rash        OBJECTIVE:     Vital Signs (Most Recent)  Temp: 98 °F (36.7 °C) (10/23/23 0912)  Pulse: 65 (10/23/23 0912)  Resp: 16 (10/23/23 0912)  BP: (!) 156/79 (10/23/23 0913)  SpO2: 95 % (10/23/23 0912)    Vital Signs Range (Last 24H):  Temp:  [98 °F (36.7 °C)]   Pulse:  [65]   Resp:  [16]   BP: (156)/(79)   SpO2:  [95 %]       Intake/Output Summary (Last 24 hours) at 10/23/2023 1213  Last data filed at 10/23/2023 1116  Gross per 24 hour   Intake 1250 ml   Output --   Net 1250 ml       Physical Exam:  General appearance: Well developed, well  nourished  Eyes:  Conjunctivae/corneas clear. PERRL.  Lungs: Normal respiratory effort,   clear to auscultation bilaterally   Heart: Regular rate and rhythm, S1, S2 normal, no murmur, rub or sandrine.  Abdomen: Soft, non-tender non-distended; bowel sounds normal; no masses,  no organomegaly, obese  Extremities: No cyanosis or clubbing. No edema.  DP pulses intact  Skin: Skin color, texture, turgor normal. No rashes or lesions  Neurologic: Normal strength and tone. No focal numbness or weakness   Right hip CDI-HUBMERTO/Hemovac  Urbina      Laboratory:  Reviewed in epic/ media/ Care Everywhere      Diagnostic Results:  No orders to display       ASSESSMENT/PLAN:     Right Hip:  per ortho/therapy teams    CKD III:  baseline 1.2-1.3.  followed by Dr. Garrido.  ALTAGRACIA NSAIDs post op.  IVF's.  Continue ARB.  RFP in am.  Renally dose meds, avoid nephrotoxins, and monitor I/O's closely. Re-discussed that she should avoid NSAIDs/ Celebrex going forward    HTN:  meds w/ hold parameters.    Anxiety:  meds noted.  Cautious with over sedation    Hypothyroid:  synthroid resumed.    DVT:  defer.        Thanks for consult  See above  Will follow along.

## 2023-10-23 NOTE — PLAN OF CARE
Sw met with patient via bedside. Patient denies any DME and is agreeable to a RW and hip kit. Patient refused the BSC. Patient is agreeable to MD's preferred choice for HH. Patient stated if she needs SNF then her preferred choice is [facility]. Patient's sister will provide transportation home. Patient will stay with her sister at [address] [place] [address]. CM will follow.     [facility] - Surgery (Anahi)  Initial Discharge Assessment       Primary Care Provider: Honorio Mcnally MD    Admission Diagnosis: Unilateral primary osteoarthritis, right hip [M16.11]  Primary osteoarthritis of right hip [M16.11]    Admission Date: 10/23/2023  Expected Discharge Date:          Payor: [place] MEDICARE / Plan: [employer] 65 / Product Type: Medicare Advantage /     Extended Emergency Contact Information  Primary Emergency Contact: Anat Bobby   [facility]  Home Phone: [phone]  Mobile Phone: [phone]  Relation: Sister    Discharge Plan A: (P) Home Health  Discharge Plan B: (P) Skilled Nursing Facility      [facility]  for [place], [place] - [address] [facility]  [address] [facility] [address]  Phone: [phone] Fax: [phone]    [id number] [place] [facility] [address] [facility]  [address] [facility]  [facility] [place] [address]  Phone: [phone] Fax: [phone]    [id number] [address] EX[address] AT [address] [address]  Phone: [phone] Fax: [phone]      Initial Assessment (most recent)       Adult Discharge Assessment - 10/23/23 3822          Discharge Assessment    Assessment Type Discharge Planning Assessment (P)      Confirmed/corrected address, phone number and insurance Yes (P)      Confirmed Demographics Correct on Facesheet (P)      Source of Information patient (P)      People in Home alone;sibling(s) (P)      Do you expect to  return to your current living situation? No (P)      Do you have help at home or someone to help you manage your care at home? Yes (P)      Who are your caregiver(s) and their phone number(s)? Anat Bobby (Sister)   [phone] (P)      Prior to hospitilization cognitive status: Alert/Oriented;No Deficits (P)      Current cognitive status: Alert/Oriented;No Deficits (P)      Equipment Currently Used at Home none (P)      Readmission within 30 days? No (P)      Patient currently being followed by outpatient case management? No (P)      Do you currently have service(s) that help you manage your care at home? No (P)      Do you take prescription medications? Yes (P)      Do you have prescription coverage? Yes (P)      Do you have any problems affording any of your prescribed medications? No (P)      Is the patient taking medications as prescribed? yes (P)      Who is going to help you get home at discharge? Anat Bobby (Sister)   [phone] (P)      How do you get to doctors appointments? car, drives self;family or friend will provide (P)      Are you on dialysis? No (P)      DME Needed Upon Discharge  hip kit;walker, rolling (P)      Discharge Plan A Home Health (P)      Discharge Plan B Skilled Nursing Facility (P)

## 2023-10-23 NOTE — PLAN OF CARE
POC in progress. Plan reviewed with pt including telemetry monitoring, pain control with PRN medication, infection prevention and proper handwashing, drain care q4h, no flexion greater than 90, scd and cachorro hose therapy forDVT prophylaxis.  IV fluids infusing. PT/OT consult. Care in progress.   Problem: Adult Inpatient Plan of Care  Goal: Plan of Care Review  10/23/2023 1702 by Elva Patterson RN  Outcome: Ongoing, Progressing  10/23/2023 1701 by Elva Patterson RN  Outcome: Ongoing, Progressing     Problem: Infection  Goal: Absence of Infection Signs and Symptoms  10/23/2023 1702 by Elva Patterson RN  Outcome: Ongoing, Progressing  10/23/2023 1701 by Elva Patterson RN  Outcome: Ongoing, Progressing     Problem: Fall Injury Risk  Goal: Absence of Fall and Fall-Related Injury  10/23/2023 1702 by Elva Patterson RN  Outcome: Ongoing, Progressing  10/23/2023 1701 by Elva Patterson RN  Outcome: Ongoing, Progressing     Problem: Pain Acute  Goal: Acceptable Pain Control and Functional Ability  Outcome: Ongoing, Progressing

## 2023-10-24 VITALS
WEIGHT: 247 LBS | SYSTOLIC BLOOD PRESSURE: 131 MMHG | HEIGHT: 64 IN | RESPIRATION RATE: 20 BRPM | BODY MASS INDEX: 42.17 KG/M2 | HEART RATE: 66 BPM | TEMPERATURE: 99 F | OXYGEN SATURATION: 95 % | DIASTOLIC BLOOD PRESSURE: 74 MMHG

## 2023-10-24 LAB
ALBUMIN SERPL BCP-MCNC: 2.6 G/DL (ref 3.5–5.2)
ANION GAP SERPL CALC-SCNC: 8 MMOL/L (ref 8–16)
BUN SERPL-MCNC: 18 MG/DL (ref 8–23)
CALCIUM SERPL-MCNC: 8 MG/DL (ref 8.7–10.5)
CHLORIDE SERPL-SCNC: 107 MMOL/L (ref 95–110)
CO2 SERPL-SCNC: 23 MMOL/L (ref 23–29)
CREAT SERPL-MCNC: 1.4 MG/DL (ref 0.5–1.4)
ERYTHROCYTE [DISTWIDTH] IN BLOOD BY AUTOMATED COUNT: 17 % (ref 11.5–14.5)
EST. GFR  (NO RACE VARIABLE): 41 ML/MIN/1.73 M^2
GLUCOSE SERPL-MCNC: 297 MG/DL (ref 70–110)
HCT VFR BLD AUTO: 36.2 % (ref 37–48.5)
HGB BLD-MCNC: 10.9 G/DL (ref 12–16)
MCH RBC QN AUTO: 27.3 PG (ref 27–31)
MCHC RBC AUTO-ENTMCNC: 30.1 G/DL (ref 32–36)
MCV RBC AUTO: 91 FL (ref 82–98)
PHOSPHATE SERPL-MCNC: 4.7 MG/DL (ref 2.7–4.5)
PLATELET # BLD AUTO: 200 K/UL (ref 150–450)
PMV BLD AUTO: 11.2 FL (ref 9.2–12.9)
POTASSIUM SERPL-SCNC: 3.8 MMOL/L (ref 3.5–5.1)
RBC # BLD AUTO: 3.99 M/UL (ref 4–5.4)
SODIUM SERPL-SCNC: 138 MMOL/L (ref 136–145)
WBC # BLD AUTO: 6.84 K/UL (ref 3.9–12.7)

## 2023-10-24 PROCEDURE — 97535 SELF CARE MNGMENT TRAINING: CPT

## 2023-10-24 PROCEDURE — 80069 RENAL FUNCTION PANEL: CPT | Performed by: NURSE PRACTITIONER

## 2023-10-24 PROCEDURE — 94799 UNLISTED PULMONARY SVC/PX: CPT

## 2023-10-24 PROCEDURE — 36415 COLL VENOUS BLD VENIPUNCTURE: CPT | Performed by: NURSE PRACTITIONER

## 2023-10-24 PROCEDURE — 97110 THERAPEUTIC EXERCISES: CPT | Mod: CQ

## 2023-10-24 PROCEDURE — 85027 COMPLETE CBC AUTOMATED: CPT | Performed by: ORTHOPAEDIC SURGERY

## 2023-10-24 PROCEDURE — 97530 THERAPEUTIC ACTIVITIES: CPT | Mod: CQ

## 2023-10-24 PROCEDURE — 97165 OT EVAL LOW COMPLEX 30 MIN: CPT

## 2023-10-24 PROCEDURE — 63600175 PHARM REV CODE 636 W HCPCS: Performed by: ORTHOPAEDIC SURGERY

## 2023-10-24 PROCEDURE — 97116 GAIT TRAINING THERAPY: CPT | Mod: CQ

## 2023-10-24 PROCEDURE — 25000003 PHARM REV CODE 250: Performed by: ORTHOPAEDIC SURGERY

## 2023-10-24 PROCEDURE — 94761 N-INVAS EAR/PLS OXIMETRY MLT: CPT

## 2023-10-24 PROCEDURE — 25000003 PHARM REV CODE 250: Performed by: NURSE PRACTITIONER

## 2023-10-24 RX ADMIN — POLYETHYLENE GLYCOL 3350 17 G: 17 POWDER, FOR SOLUTION ORAL at 08:10

## 2023-10-24 RX ADMIN — PREGABALIN 75 MG: 75 CAPSULE ORAL at 08:10

## 2023-10-24 RX ADMIN — ASPIRIN 81 MG CHEWABLE TABLET 81 MG: 81 TABLET CHEWABLE at 08:10

## 2023-10-24 RX ADMIN — DEXTROSE MONOHYDRATE 1 G: 2.5 INJECTION INTRAVENOUS at 04:10

## 2023-10-24 RX ADMIN — OXYCODONE HYDROCHLORIDE 10 MG: 10 TABLET ORAL at 06:10

## 2023-10-24 RX ADMIN — LEVOTHYROXINE SODIUM 175 MCG: 125 TABLET ORAL at 05:10

## 2023-10-24 RX ADMIN — DEXTROSE AND SODIUM CHLORIDE: 5; 900 INJECTION, SOLUTION INTRAVENOUS at 02:10

## 2023-10-24 RX ADMIN — CEFAZOLIN 2 G: 2 INJECTION, POWDER, FOR SOLUTION INTRAMUSCULAR; INTRAVENOUS at 02:10

## 2023-10-24 RX ADMIN — OXYCODONE HYDROCHLORIDE 10 MG: 10 TABLET ORAL at 12:10

## 2023-10-24 RX ADMIN — FAMOTIDINE 20 MG: 20 TABLET ORAL at 08:10

## 2023-10-24 RX ADMIN — MUPIROCIN: 20 OINTMENT TOPICAL at 08:10

## 2023-10-24 RX ADMIN — LAMOTRIGINE 25 MG: 25 TABLET ORAL at 08:10

## 2023-10-24 RX ADMIN — DOCUSATE SODIUM 100 MG: 100 CAPSULE, LIQUID FILLED ORAL at 08:10

## 2023-10-24 RX ADMIN — OXYCODONE HYDROCHLORIDE 10 MG: 10 TABLET ORAL at 04:10

## 2023-10-24 RX ADMIN — ALPRAZOLAM 0.25 MG: 0.25 TABLET ORAL at 10:10

## 2023-10-24 NOTE — NURSING
Nurses Note -- 4 Eyes      10/24/2023   4:59 PM      Skin assessed during: Admit      [x] No Altered Skin Integrity Present    []Prevention Measures Documented      [] Yes- Altered Skin Integrity Present or Discovered   [] LDA Added if Not in Epic (Describe Wound)   [] New Altered Skin Integrity was Present on Admit and Documented in LDA   [] Wound Image Taken    Wound Care Consulted? No    Attending Nurse:  Rossi Abdullahi RN/Staff Member: Katia.

## 2023-10-24 NOTE — PLAN OF CARE
Patient's preferred pharmacy is [facility]. Patient will be discharging with a HD RW. Patient will be discharging with [facility] as HH. Patient's family will provide transportation home.     [facility] ([address])  Discharge Final Note    Primary Care Provider: Honorio Mcnally MD    Expected Discharge Date: 10/24/2023    Final Discharge Note (most recent)       Final Note - 10/24/23 1538          Final Note    Assessment Type Final Discharge Note (P)      Anticipated Discharge Disposition Home-Health Care Svc (P)      Hospital Resources/Appts/Education Provided Provided patient/caregiver with written discharge plan information;Appointments scheduled and added to AVS (P)         Post-Acute Status    Post-Acute Authorization Home Health (P)      Home Health Status Set-up Complete/Auth obtained (P)      Discharge Delays None known at this time (P)                      Contact Info       Dez Strange MD   Specialty: Orthopedic Surgery    [address]  [facility]  [facility] [address]   Phone: [phone]       Next Steps: Follow up in 4 day(s)    [facility]   Specialty: Home Health Services, Home Therapy Services, Home Living Aide Services    [address] [facility] [address]   Phone: [phone]       Next Steps: Follow up on 10/25/2023    Instructions: They will contact you to set up home healthcare appointments.    [facility]   Specialty: DME Provider    [address]  [facility] [address]   Phone: [phone]       Next Steps: Follow up on 10/24/2023    Instructions: Contact if you have any issues with your medical equipment.             Plan: \\nTriple paste bid for flares Detail Level: Detailed

## 2023-10-24 NOTE — PROGRESS NOTES
"Nephrology  Progress Note    Admit Date: 10/23/2023   LOS: 0 days     SUBJECTIVE:     Follow-up For:  Primary osteoarthritis of right hip    Interval History:     slow with therapy.  Discussed with Dr. Strange/RN at bedside.  No CP/SOB/Calf pain.   Asymptomatic soft BPs, but asymptomatic. Attempting to work with PT this afternoon.    Review of Systems:  Constitutional: No fever or chills  Respiratory: No cough or shortness of breath  Cardiovascular: No chest pain or palpitations  Gastrointestinal: No nausea or vomiting  Neurological: No confusion or weakness    OBJECTIVE:     Vital Signs Range (Last 24H):  BP (!) 100/52 (BP Location: Left arm, Patient Position: Lying)   Pulse (!) 53   Temp 97.6 °F (36.4 °C) (Oral)   Resp 18   Ht 5' 4" (1.626 m)   Wt 112 kg (247 lb)   LMP 11/02/2016   SpO2 97%   Breastfeeding No   BMI 42.40 kg/m²     Temp:  [97.4 °F (36.3 °C)-98.9 °F (37.2 °C)]   Pulse:  [48-65]   Resp:  [16-18]   BP: (100-176)/(52-79)   SpO2:  [92 %-98 %]     I & O (Last 24H):  Intake/Output Summary (Last 24 hours) at 10/24/2023 0908  Last data filed at 10/24/2023 0520  Gross per 24 hour   Intake 1600 ml   Output 580 ml   Net 1020 ml       Physical Exam:  General appearance: Well developed, well nourished  Eyes:  Conjunctivae/corneas clear. PERRL.  Lungs: Normal respiratory effort,   clear to auscultation bilaterally   Heart: Regular rate and rhythm, S1, S2 normal, no murmur, rub or sandrine.  Abdomen: Soft, non-tender non-distended; bowel sounds normal; no masses,  no organomegaly, obese  Extremities: No cyanosis or clubbing. No edema.    Skin: Skin color, texture, turgor normal. No rashes or lesions  Neurologic: Normal strength and tone. No focal numbness or weakness   Right Hip-HUMBERTO    Laboratory Data:  Recent Labs   Lab 10/24/23  0413   WBC 6.84   RBC 3.99*   HGB 10.9*   HCT 36.2*      MCV 91   MCH 27.3   MCHC 30.1*       BMP:   Recent Labs   Lab 10/24/23  0413   *      K 3.8    " "  CO2 23   BUN 18   CREATININE 1.4   CALCIUM 8.0*   PHOS 4.7*     Lab Results   Component Value Date    CALCIUM 8.0 (L) 10/24/2023    PHOS 4.7 (H) 10/24/2023       Lab Results   Component Value Date    CALCIUM 8.0 (L) 10/24/2023    PHOS 4.7 (H) 10/24/2023       No results found for: "URICACID"    BNP  No results for input(s): "BNP", "BNPTRIAGEBLO" in the last 168 hours.    Medications:  Medication list was reviewed and changes noted under Assessment/Plan.    Diagnostic Results:        ASSESSMENT/PLAN:       Right Hip:  per ortho/therapy teams     CKD III:  baseline 1.2-1.3.  followed by Dr. Garrido.  DC NSAIDs post op.  IVF's.  Continue ARB.  RFP with Creat 1.4 likely from ABLA/Hypotension.  Renally dose meds, avoid nephrotoxins, and monitor I/O's closely. Re-discussed that she should avoid NSAIDs/ Celebrex going forward.  Encouraging oral intake and giving bolus of IVF's     HTN:  meds w/ hold parameters.  Soft BP's.  Meds held and giving bolus w/ oral intake encouragements.     Anxiety:  meds noted.  Cautious with over sedation     Hypothyroid:  synthroid resumed.     DVT:  defer.      Monitor today.                    "

## 2023-10-24 NOTE — CARE UPDATE
10/24/23 0800   Patient Assessment/Suction   Level of Consciousness (AVPU) alert   Respiratory Effort Normal;Unlabored   Expansion/Accessory Muscles/Retractions no retractions;no use of accessory muscles   All Lung Fields Breath Sounds clear;equal bilaterally   PRE-TX-O2   Device (Oxygen Therapy) room air   SpO2 (!) 93 %   Pulse Oximetry Type Intermittent   $ Pulse Oximetry - Multiple Charge Pulse Oximetry - Multiple   Incentive Spirometer   $ Incentive Spirometer Charges done with encouragement   Administration (IS) instruction provided, follow-up   Number of Repetitions (IS) 10   Level Incentive Spirometer (mL) 1500   Patient Tolerance (IS) good;no adverse signs/symptoms present

## 2023-10-24 NOTE — PROGRESS NOTES
"Progress Note  Orthopedics    Admit Date: 10/23/2023   Patient ID: Tori Hercules is a 68 y.o. female.  Postop day 1. Right total hip replacement.  Dressing dry, neurovascularly intact.  Hemovac drain pulled.  Presently has a kaila drain.  Ambulated 70 ft with PT yesterday.  Plan for discharge today with home health.  Follow-up in 4 weeks.  Instructed on hip precautions.            Dez CASEY Millfrancis      Vital Sign (recent):  BP (!) 100/52 (BP Location: Left arm, Patient Position: Lying)   Pulse (!) 53   Temp 97.6 °F (36.4 °C) (Oral)   Resp 18   Ht 5' 4" (1.626 m)   Wt 112 kg (247 lb)   LMP 11/02/2016   SpO2 97%   Breastfeeding No   BMI 42.40 kg/m²       Laboratory:    CBC:   Recent Labs   Lab 10/24/23  0413   WBC 6.84   RBC 3.99*   HGB 10.9*   HCT 36.2*      MCV 91   MCH 27.3   MCHC 30.1*       CMP:   Recent Labs   Lab 10/24/23  0413   *   CALCIUM 8.0*   ALBUMIN 2.6*      K 3.8   CO2 23      BUN 18   CREATININE 1.4           Intake/Output Summary (Last 24 hours) at 10/24/2023 0825  Last data filed at 10/24/2023 0520  Gross per 24 hour   Intake 1600 ml   Output 580 ml   Net 1020 ml         Current Medications:   acetaminophen  1,000 mg Oral Q8H    aspirin  81 mg Oral BID    atorvastatin  80 mg Oral QHS    carvediloL  12.5 mg Oral BID WM    docusate sodium  100 mg Oral Q12H    famotidine  20 mg Oral BID    lamoTRIgine  25 mg Oral BID    levothyroxine  175 mcg Oral Before breakfast    losartan  100 mg Oral Daily    mupirocin   Nasal BID    polyethylene glycol  17 g Oral Daily    pregabalin  75 mg Oral Daily       Continuous Infusions:   dextrose 5 % and 0.9 % NaCl 100 mL/hr at 10/24/23 6343    lactated ringers       PRN Meds:.ALPRAZolam, bisacodyL, cloNIDine, diphenhydrAMINE, HYDROmorphone, metoclopramide HCl, ondansetron, oxyCODONE, oxyCODONE, sodium chloride 0.9%, tiZANidine   "

## 2023-10-24 NOTE — PT/OT/SLP EVAL
Occupational Therapy   Evaluation & Treatment    Name: Tori Hercules  MRN: [id number]  Admitting Diagnosis: Primary osteoarthritis of right hip  Recent Surgery: Procedure(s) (LRB):  ARTHROPLASTY, HIP, TOTAL, POSTERIOR APPROACH (Right) 1 Day Post-Op    Recommendations:     Discharge Recommendations: Moderate Intensity Therapy  Discharge Equipment Recommendations:  bedside commode, walker, rolling  Barriers to discharge:  Decreased caregiver support, Inaccessible home environment (24/7 assist not available; pt will mostly be alone during the day- assist available in evening.)    Assessment:     Tori Hercules is a 68 y.o. female with a medical diagnosis of Primary osteoarthritis of right hip.  She presents with mild pain in R hip. Performance deficits affecting function: weakness, impaired endurance, impaired self care skills, impaired functional mobility, gait instability, impaired balance, decreased lower extremity function, decreased upper extremity function, decreased safety awareness, pain, decreased ROM.  Pt agreeable to participating in therapy upon arrival to room.  Pt required Mod A, RW (and two attempts) to perform sit <> stand transfer from chair and Min A, RW from BSC.  Once standing pt able to maintain standing balance with SBA, RW.  Pt required Min A, reacher, RW to perform LB dressing.  Pt able to perform toilet transfer to BSC with CGA, RW.  Once seated on BSC pt able to perform toileting with SBA.  SBA, RW required to perform grooming task standing at sink with no instances of postural sway observed.      Overall, pt tolerated therapy well; however, increased time and effort required for all activity.  With cues pt able to adhere to orthopedic precautions throughout session.  PTA pt reports being (I) with ADLs and Mod I for functional mobility.  At home pt will be by herself majority of morning/early afternoon as no family is available consistently at that time.  Pt reports sister home in  "afternoon, and niece and nephew live next door also available to provide assist in evening after work.  Pt would benefit from skilled OT services to address problems listed above and increase independence with ADLs.  Moderate intensity therapy is recommended upon d/c from acute care to further address deficits and help pt improve overall functional independence.          Rehab Prognosis: Good; patient would benefit from acute skilled OT services to address these deficits and reach maximum level of function.       Plan:     Patient to be seen daily   to address the above listed problems via self-care/home management, therapeutic activities, therapeutic exercises  Plan of Care Expires: 11/23/23  Plan of Care Reviewed with: patient    Subjective     Chief Complaint: pain in hip, difficultly navigating home set up  Patient/Family Comments/goals: "Return to playing golf and swing dancing"    Occupational Profile:  Living Environment: Pt is going to stay at sister's house- [facility], [address].  Bathroom has clawfoot tub (pt unable to get in/out of pre-surgery).  Walk-in shower (pt states she will be using this) has 2 [address].  Grab bar (not built in) present on right side.  Previous level of function:   -ADLs: Independent  -iADLs: Independent  -Mobility: Modified Independent with SPC  Roles and Routines: Sister, aunt, previously enjoyed golf and swing dancing  Equipment Used at Home: SPC  Assistance upon Discharge: Sister's routine: drop off 2 kids at school in morning, has a project she is committed to that takes additional two hours,  kids, home in afternoon.  Niece and nephew live next door and able to provide assist after work.  *Pt states she would be alone for 2-3 hours at most; however, unsure if accurate as schedule discussed appears to involve sister being unavailable until afternoon    Pain/Comfort:  Pain Rating 1: 3/10  Location - Side 1: Right  Location - Orientation 1: generalized  Location 1: hip  Pain Addressed " 1: Reposition, Distraction, Cessation of Activity  Pain Rating Post-Intervention 1: 10/10  Location - Side 2: Right  Location - Orientation 2: generalized  Location 2: hip    Patients cultural, spiritual, Mosque conflicts given the current situation: no    Objective:     Communicated with: RN (Rossi), PTA (Shona) prior to session and [facility] liaison (after session).  Patient found up in chair with peripheral IV (ice pack, HUMBERTO drain) upon OT entry to room.    General Precautions: Standard, fall  Orthopedic Precautions: RLE weight bearing as tolerated, RLE posterior precautions  Braces: N/A  Respiratory Status: Room air    Occupational Performance:    Bed Mobility:    Not assessed 2* pt up in chair upon arrival.    Functional Mobility/Transfers:  Sit <> Stand:   Mod A, RW x 1 trial from chair (with 2 attempts)  Min A, RW x 1 trial from BSC  Notes: Notes cues for technique and precautions required  Toilet: CGA, RW taking steps to BSC  Cues for technique and precautions required  Functional Mobility: Pt ambulated ~40 ft in room with CGA-SBA, RW.  No instances of LOB noted    Activities of Daily Living:  Grooming: SBA, RW for washing hands, brushing teeth, and cleaning dentures while standing at sink.  Pt stood for ~6 minutes during task; SBA, RW.  Upper Body Dressing: Mod I, RW for doffing gown and donning shirt while standing in front of chair.  Lower Body Dressing: Min A, reacher for donning pants.  Pt started task in seated position, then stood with RW to pull up.  Toileting: SBA, RW for urinating from BSC.  Pt performed hygiene care in seated position  Several attempts required  Cues for technique required    Cognitive/Visual Perceptual:  Cognitive/Psychosocial Skills:    -       Oriented to: Person, Place, Time, and Situation   -       Follows Commands/attention:Follows all commands  -       Communication: clear/fluent  -       Memory: No Deficits noted  -       Safety awareness/insight to disability:  mildly impaired; cues required for precautions and some safety during mobility   -       Mood/Affect/Coping skills/emotional control: Cooperative and Pleasant; reported feeling very anxious at start of session    Physical Exam:  Postural examination/scapula alignment: No postural abnormalities identified  Skin integrity: Visible skin intact  Edema:  None noted  Sensation: -       Intact  Motor Planning: -       WNL  Dominant hand: -       right  Upper Extremity Range of Motion: As observed through functional tasks    -       Right Upper Extremity: WNL  -       Left Upper Extremity: WNL  Upper Extremity Strength: As observed through functional tasks  -       Right Upper Extremity: WNL  -       Left Upper Extremity: WNL   Strength: 5/5 both hands  Fine Motor Coordination: Intact  Gross motor coordination: WFL  Balance: Sitting- Independent; Standing- SBA-CGA    AMPAC 6 Click ADL:  AMPAC Total Score: 18    Treatment & Education:  *Pt educated on:   -role of OT in acute care setting  -orthopedic precautions  -LB dressing technique  -transfer technique from EOB, chair, and BSC  -importance of facing activity during ADLs/IADLs to prevent rotation of hip  -hip kit  -hip abduction pillow  *Pt performed LB dressing with adaptive equipment; cues for technique required  *Pt performed sit <> stand transfer from chair; increased time and cues required for technique  *Pt ambulated within room to address coordination, endurance, and balance needed for functional mobility  *Pt performed toilet transfer to BSC; cues for technique required  *Pt performed toileting from BSC; cues for technique required  *Pt stood for ~6 minutes to perform grooming tasks standing at sink; cues for RW placement and setup provided  *POC and precautions reviewed with pt      Patient left up in chair in reclined position with ice pack, all lines intact, call button in reach, and RN ortho liaison ([facility])  notified    GOALS:   Multidisciplinary Problems        Occupational Therapy Goals          Problem: Occupational Therapy    Goal Priority Disciplines Outcome Interventions   Occupational Therapy Goal     OT, PT/OT Ongoing, Progressing    Description: Goals to be met by: 11/1/2023     Patient will increase functional independence with ADLs by performing:    LE Dressing with Stand-by Assistance.  Grooming while standing at sink with Supervision.  Toileting from bedside commode with Supervision for hygiene and clothing management.   Toilet transfer to bedside commode with Supervision.  Pt will identify posterior hip precautions via teach back method.                         History:     Past Medical History:   Diagnosis Date    ADD (attention deficit disorder) without hyperactivity     Endometriosis     Generalized anxiety disorder     Herpes simplex virus (HSV) infection     Hypertension     Hypothyroidism     Hashimoto's    Osteopenia     Renal disorder     Vulvar atrophy          Past Surgical History:   Procedure Laterality Date    LAPAROSCOPY W/ MINI-LAPAROTOMY  1990    Endometriosis    RENAL ARTERY STENT      2018    TONSILLECTOMY  1960       Time Tracking:     OT Date of Treatment: 10/24/23  OT Start Time: 1046  OT Stop Time: 1156  OT Total Time (min): 70 min    Billable Minutes:Evaluation 15  Self Care/Home Management 55    NILAM Zambrano  10/24/2023

## 2023-10-24 NOTE — PLAN OF CARE
Problem: Occupational Therapy  Goal: Occupational Therapy Goal  Description: Goals to be met by: 11/1/2023     Patient will increase functional independence with ADLs by performing:    LE Dressing with Stand-by Assistance.  Grooming while standing at sink with Supervision.  Toileting from bedside commode with Supervision for hygiene and clothing management.   Toilet transfer to bedside commode with Supervision.  Pt will identify posterior hip precautions via teach back method.    Outcome: Ongoing, Progressing     OT evaluation complete and POC established.  Moderate intensity therapy is recommended upon d/c from acute care to further address deficits and help pt improve overall functional independence.     NILAM Zambrano  10/24/2023

## 2023-10-24 NOTE — PT/OT/SLP PROGRESS
"Physical Therapy Treatment    Patient Name:  Tori Hercules   MRN:  [id number]    Recommendations:     Discharge Recommendations: Moderate Intensity Therapy  Discharge Equipment Recommendations: bedside commode, walker, rolling  Barriers to discharge: Decreased caregiver support    Assessment:     Tori Hercules is a 68 y.o. female admitted with a medical diagnosis of Primary osteoarthritis of right hip.  She presents with the following impairments/functional limitations: weakness, impaired endurance, impaired self care skills, impaired functional mobility, gait instability, impaired balance, decreased lower extremity function, decreased upper extremity function, decreased safety awareness, pain, decreased ROM, impaired skin, edema, orthopedic precautions ;pt with fair mobility today, limited by pain in hip, pt w/ dec amb distance this AM and needing min/modA for sit to stand t/f's.    Rehab Prognosis: Good; patient would benefit from acute skilled PT services to address these deficits and reach maximum level of function.    Recent Surgery: Procedure(s) (LRB):  ARTHROPLASTY, HIP, TOTAL, POSTERIOR APPROACH (Right) 1 Day Post-Op    Plan:     During this hospitalization, patient to be seen BID to address the identified rehab impairments via gait training, therapeutic activities, therapeutic exercises and progress toward the following goals:    Plan of Care Expires:  11/02/23    Subjective     Chief Complaint: pain  Patient/Family Comments/goals: pt reporting, "It's much worse today (pain). I did better yesterday".   Pain/Comfort:  Pain Rating 1: 1/10 (at rest)  Location - Side 1: Right  Location - Orientation 1: generalized  Location 1: hip  Pain Addressed 1: Pre-medicate for activity, Reposition, Distraction  Pain Rating Post-Intervention 1: 8/10 (w/ mobility/amb)      Objective:     Communicated with nurse prior to session.  Patient found HOB elevated with peripheral IV, hip abduction pillow, FCD, bed alarm, " "telemetry (HUMBERTO drain) upon PT entry to room.     General Precautions: Standard, fall  Orthopedic Precautions: RLE weight bearing as tolerated, RLE posterior precautions  Braces: N/A  Respiratory Status: Room air     Functional Mobility:  Bed Mobility:     Supine to Sit: minimum assistance and from bed at ~45* and lots of cueing for technique.  Transfers:     Sit to Stand:  minimum assistance and moderate assistance with rolling walker  Gait: amb'd 35' w/ RW and CGA/min.A, WBAT on RLE, cueing and Demond for advancing RLE properly, pt inching leg forward for every step,not lifting it up. Pt able to advance RLE properly towards end of distance.       AM-PAC 6 CLICK MOBILITY  Turning over in bed (including adjusting bedclothes, sheets and blankets)?: 3  Sitting down on and standing up from a chair with arms (e.g., wheelchair, bedside commode, etc.): 3  Moving from lying on back to sitting on the side of the bed?: 3  Moving to and from a bed to a chair (including a wheelchair)?: 3  Need to walk in hospital room?: 3  Climbing 3-5 steps with a railing?: 2  Basic Mobility Total Score: 17       Treatment & Education:  Pt perf'd supine AP's, QS, GS, gently heelslides x 5-10 ea.   Reviewed post hip prec. W/ pt, as well as safety in /around home.  Pt reporting her sister has 1 step to get into home and 2 steps to get into shower area, and that there are "toys everywhere" in home.    Patient left up in chair , pillow beneath, with all lines intact, call button in reach, and nurse notified..    GOALS:   Multidisciplinary Problems       Physical Therapy Goals          Problem: Physical Therapy    Goal Priority Disciplines Outcome Goal Variances Interventions   Physical Therapy Goal     PT, PT/OT Ongoing, Progressing     Description: Goals to be met by: 2023     Patient will increase functional independence with mobility by performin. Supine to sit with supervision.   2. Sit to supine with supervision.   3. Sit<>stand " transfer with supervision using rolling walker.   4. Gait > 150 feet with SBA using rolling walker.   5. Ascend/descend 4 stairs with one sided handrails with CGA no AD.  6. Verbalize posterior hip precautions without verbal cues.                          Time Tracking:     PT Received On: 10/24/23  PT Start Time: 0916     PT Stop Time: 1014  PT Total Time (min): 58 min     Billable Minutes: Gait Training 20, Therapeutic Activity 24, and Therapeutic Exercise 14    Treatment Type: Treatment  PT/PTA: PTA     Number of PTA visits since last PT visit: 1     10/24/2023

## 2023-10-24 NOTE — NURSING
Pt discharged to home with brother.  Pt is Aox4 and states verbal understanding of all discharge instructions.  Pt given pain medications prior to discharge for a pain level of 9/10 and after 45 minutes pain remained at 7/10.  Pt ambulated to restroom with standby assistance. IV access x1 removed and cardiac monitor removed as well. Pt received medications from [facility] (2).  Pt refused aspirin 81mg stating she has it at home. Walker delivered to the bedside by .  Pt remains fearful about going home alone.  Pt reassured. Ice bag refilled.  Pt left via wheelchair with all personal belongings. Pt safe.

## 2023-10-24 NOTE — NURSING
Urbina removed @ 0544. Pt tolerated well. Voiding trial education discussed with pt. Pt encouraged to increase fluid intake. Pt verbalizes understanding.

## 2023-10-25 NOTE — DISCHARGE SUMMARY
[facility]  Short Stay  Discharge Summary  TOTAL HIP REPLACEMENT    Admit Date: 10/23/2023    Discharge Date and Time: 10/24/2023  5:40 PM      Discharge Attending Physician: Dez Strange MD     Hospital Course:  Tori Hercules,is a 68 y.o. female with severe osteoarthritis,   R hip, unrelieved with the conservative measures. The patient was admitted on 10/23/2023 underwent R total hip replacement.  Postoperatively, the patient did well and was weightbearing as tolerated with a walker. Tori Hercules was instructed on hip precautions.  The patient was discharged on 10/24/2023 with home health physical therapy and nursing. The patient will be on Percocet for pain and aspirin 325 mg p.o. b.i.d. with meals x4 weeks. I will see them back in the office in 4 weeks for followup.      Final Diagnoses:    Principal Problem: Primary osteoarthritis of right hip   Secondary Diagnoses:   Active Hospital Problems    Diagnosis  POA    *Primary osteoarthritis of right hip [M16.11]  Yes      Resolved Hospital Problems   No resolved problems to display.       Discharged Condition: good    Disposition: Home-Health Care [facility] [place] [facility]    Follow up/Patient Instructions:    Medications:  Reconciled Home Medications:      Medication List        START taking these medications      aspirin 81 MG Chew  Take 1 tablet (81 mg total) by mouth 2 (two) times daily.  Replaces: aspirin 81 MG EC tablet     ondansetron 8 MG Tbdl  Commonly known as: ZOFRAN-ODT  Take 1 tablet (8 mg total) by mouth every 8 (eight) hours as needed (Nausea).     oxyCODONE-acetaminophen 5-325 mg per tablet  Commonly known as: PERCOCET  Take 1 tablet by mouth every 4 hours as needed  or 2 tablets every 6 hours as needed            CONTINUE taking these medications      ALPRAZolam 0.5 MG tablet  Commonly known as: XANAX  2 (two) times daily as needed.     carvediloL 12.5 MG tablet  Commonly known as: COREG  2 (two) times a day.     chlorthalidone 25 MG Tab  Commonly  known as: HYGROTEN  chlorthalidone 25 mg tablet   Take 1 tablet every day by oral route.     * clobetasol 0.05% 0.05 % Oint  Commonly known as: TEMOVATE  APPLY 1/3 OF A PENCIL ERASER TOPICALLY TWICE DAILY FOR 2 WEEKS THEN ONCE DAILY FOR 2 WEEKS THEN  ON  MONDAY  AND  THURSDAY     * clobetasol 0.05% 0.05 % Oint  Commonly known as: TEMOVATE  Apply amount of 1/3 of pencil eraser twice daily for 1 weeks and then once daily for 1 weeks, then twice weekly     * clobetasol 0.05% 0.05 % Oint  Commonly known as: TEMOVATE  Apply amount of 1/4 of pencil eraser twice weekly Monday and Thursday.     cloNIDine 0.1 MG tablet  Commonly known as: CATAPRES  Take 1 tablet by mouth 2 (two) times daily. Takes if BP > 160     estradioL 0.01 % (0.1 mg/gram) vaginal cream  Commonly known as: ESTRACE  1/2 applicator nightly for 1 week, then 1 4th applicator twice weekly.     ferrous gluconate 324 MG tablet  Commonly known as: FERGON     irbesartan 300 MG tablet  Commonly known as: AVAPRO  Take 1 tablet by mouth once daily.     lamoTRIgine 25 MG tablet  Commonly known as: LAMICTAL  lamotrigine 25 mg tablet   TAKE 2 TABS BY MOUTH EVERY DAY     levothyroxine 175 MCG tablet  Commonly known as: SYNTHROID, LEVOTHROID  Take 175 mcg by mouth before breakfast.     multivitamin capsule  Take 1 capsule by mouth once daily.     nystatin powder  Commonly known as: MYCOSTATIN  Apply to affected area 3 times daily as needed     pantoprazole 40 MG tablet  Commonly known as: PROTONIX  TK 1 T PO D     pregabalin 75 MG capsule  Commonly known as: LYRICA  pregabalin 75 mg capsule     PREMARIN vaginal cream  Generic drug: conjugated estrogens  1 APPLICATOR (4 GM) EVERYDAY FOR 1 WEEKS THEN 1/2 APPLICATOR (2 GM) for 1 week     PRESERVISION AREDS-2 ORAL  Take by mouth Daily.     rosuvastatin 10 MG tablet  Commonly known as: CRESTOR  TK 1 T PO D     tiZANidine 4 MG tablet  Commonly known as: ZANAFLEX  Take 4 mg by mouth every 6 (six) hours.     * valACYclovir 1000  "MG tablet  Commonly known as: VALTREX  1000 mg as directed p.r.n. outbreak     * valACYclovir 500 MG tablet  Commonly known as: VALTREX  One tablet twice a day for 3 days p.r.n. outbreaks           * This list has 5 medication(s) that are the same as other medications prescribed for you. Read the directions carefully, and ask your doctor or other care provider to review them with you.                STOP taking these medications      aspirin 81 MG EC tablet  Commonly known as: ECOTRIN  Replaced by: aspirin 81 MG Chew     traMADoL 50 mg tablet  Commonly known as: ULTRAM            Discharge Procedure Orders   WALKER FOR HOME USE     Order Specific Question Answer Comments   Type of Walker: Adult (5'4"-6'6")    With wheels? Yes    Height: 5' 4" (1.626 m)    Weight: 112 kg (247 lb)    Does patient have medical equipment at home? cane, straight    Length of need (1-99 months): 99      HIP KIT FOR HOME USE     Order Specific Question Answer Comments   Height: 5' 4" (1.626 m)    Weight: 112 kg (247 lb)    Does patient have medical equipment at home? cane, straight    Length of need (1-99 months): 99    Type: [place] Hip Kit      3 IN 1 COMMODE FOR HOME USE     Order Specific Question Answer Comments   Type: Standard    Height: 5' 4" (1.626 m)    Weight: 112 kg (247 lb)    Does patient have medical equipment at home? cane, straight    Length of need (1-99 months): 99      TRANSFER TUB BENCH FOR HOME USE     Order Specific Question Answer Comments   Type of Transfer Tub Bench: Unpadded    Height: 5' 4" (1.626 m)    Weight: 112 kg (247 lb)    Does patient have medical equipment at home? cane, straight    Length of need (1-99 months): 99    Patient notified - Not covered by insurance considered a convenience item Yes    Discussed financial responsibility with responsible party Yes      BATH/SHOWER CHAIR FOR HOME USE     Order Specific Question Answer Comments   Height: 5' 4" (1.626 m)    Weight: 112 kg (247 lb)    Does " patient have medical equipment at home? cane, straight    Length of need (1-99 months): 99    Type: With back      Change dressing (specify)   Order Comments: Home Health to perform dressing changes      Follow-up Information       Dez Strange MD Follow up in 4 day(s).    Specialty: Orthopedic Surgery  Contact information:  [address]  [facility]  [facility] [address]  [phone]               AJ BLISS Follow up on 10/25/2023.    Specialties: Home Health Services, Home Therapy Services, Home Living Aide Services  Why: They will contact you to set up home healthcare appointments.  Contact information:  [address] [facility] [address]  [phone]             [facility] Follow up on 10/24/2023.    Specialty: DME Provider  Why: Contact if you have any issues with your medical equipment.  Contact information:  [address]  [facility]  [facility] [address]  [phone]

## 2023-11-28 ENCOUNTER — EXTERNAL HOME HEALTH (OUTPATIENT)
Dept: HOME HEALTH SERVICES | Facility: HOSPITAL | Age: 68
End: 2023-11-28
Payer: MEDICARE

## 2023-12-07 ENCOUNTER — DOCUMENT SCAN (OUTPATIENT)
Dept: HOME HEALTH SERVICES | Facility: HOSPITAL | Age: 68
End: 2023-12-07
Payer: MEDICARE

## (undated) DEVICE — DRAPE THREE-QTR REINF 53X77IN

## (undated) DEVICE — DRESSING PICO 7 TWO 10X30CM

## (undated) DEVICE — GLOVE BIOGEL SKINSENSE PI 7.0

## (undated) DEVICE — SUT ETHIBOND EXCEL 5-0 V-40

## (undated) DEVICE — COVER HD BACK TABLE 6FT

## (undated) DEVICE — Device

## (undated) DEVICE — SUT VICRYL PLUS 2-0 CT1 18

## (undated) DEVICE — UNDERGLOVES BIOGEL PI SIZE 8

## (undated) DEVICE — SHEET HIP ABSORB CIRC ELAS 8

## (undated) DEVICE — GLOVE BIOGEL SKINSENSE PI 7.5

## (undated) DEVICE — DRAPE CAMERA/VIDEO 5 X 96

## (undated) DEVICE — DRAPE STERI INSTRUMENT 1018

## (undated) DEVICE — KIT TOTAL HIP OPTIVAC

## (undated) DEVICE — SPONGE GAUZE 16PLY 4X4

## (undated) DEVICE — TRAY CATH FOL SIL URIMTR 16FR

## (undated) DEVICE — GOWN ECLIPSE REINF LVL4 TWL LG

## (undated) DEVICE — NDL 21GA X1 1/2 REG BEVEL

## (undated) DEVICE — SOL IRR SOD CHL .9% POUR

## (undated) DEVICE — COVER MAYO STAND REINFRCD 30

## (undated) DEVICE — SYR 0.9% NACL 10ML STERILE

## (undated) DEVICE — APPLICATOR CHLORAPREP ORN 26ML

## (undated) DEVICE — PULSAVAC ZIMMER

## (undated) DEVICE — ALCOHOL ISOPROPYL BLU 70% 16OZ

## (undated) DEVICE — MASK FLYTE HOOD PEEL AWAY

## (undated) DEVICE — BNDG COFLEX FOAM LF2 ST 6X5YD

## (undated) DEVICE — BANDAGE CURAD ADH FABRIC 1X3IN

## (undated) DEVICE — ELECTRODE REM PLYHSV RETURN 9

## (undated) DEVICE — SUT VICRYL+ 1 CTX 18IN VIOL

## (undated) DEVICE — SOL NACL IRR 3000ML

## (undated) DEVICE — GLOVE BIOGEL SKINSENSE PI 8.5

## (undated) DEVICE — CONTAINER SPEC OR STRL 4.5OZ

## (undated) DEVICE — UNDERPAD DELUXE FLUFF 30X30IN

## (undated) DEVICE — DRESSING AQUACEL AG 3.5X10IN

## (undated) DEVICE — POSITIONER IV ARMBOARD FOAM

## (undated) DEVICE — BLADE SAG DUAL 18MMX1.27MMX90M

## (undated) DEVICE — SPONGE LAP 18X18 PREWASHED

## (undated) DEVICE — SUT STRATAFIX PDS 1 CTX 18IN

## (undated) DEVICE — DRAPE STERI U-SHAPED 47X51IN

## (undated) DEVICE — DRAPE INCISE IOBAN 2 23X17IN

## (undated) DEVICE — TIP YANKAUERS BULB NO VENT

## (undated) DEVICE — UNDERGLOVES BIOGEL PI SIZE 8.5

## (undated) DEVICE — STAPLER SKIN PROXIMATE WIDE

## (undated) DEVICE — UNDERGLOVES BIOGEL PI SZ 7 LF